# Patient Record
Sex: FEMALE | Race: WHITE | NOT HISPANIC OR LATINO | Employment: FULL TIME | URBAN - METROPOLITAN AREA
[De-identification: names, ages, dates, MRNs, and addresses within clinical notes are randomized per-mention and may not be internally consistent; named-entity substitution may affect disease eponyms.]

---

## 2021-02-03 LAB
EXTERNAL CHLAMYDIA SCREEN: NORMAL
EXTERNAL GONORRHEA SCREEN: NORMAL

## 2021-02-12 LAB
EXTERNAL ABO GROUPING: NORMAL
EXTERNAL ANTIBODY SCREEN: NORMAL
EXTERNAL HEMATOCRIT: 39.9 %
EXTERNAL HEMOGLOBIN: 13.5 G/DL
EXTERNAL HEPATITIS B SURFACE ANTIGEN: NORMAL
EXTERNAL PLATELET COUNT: 305 K/ΜL
EXTERNAL RH FACTOR: POSITIVE
EXTERNAL RUBELLA IGG QUANTITATION: NORMAL
EXTERNAL SYPHILIS RPR SCREEN: NORMAL

## 2021-03-25 ENCOUNTER — TRANSCRIBE ORDERS (OUTPATIENT)
Dept: PERINATAL CARE | Facility: CLINIC | Age: 41
End: 2021-03-25

## 2021-03-25 DIAGNOSIS — O09.899 SUPERVISION OF OTHER HIGH RISK PREGNANCIES, UNSPECIFIED TRIMESTER: Primary | ICD-10-CM

## 2021-03-26 ENCOUNTER — TELEPHONE (OUTPATIENT)
Dept: PERINATAL CARE | Facility: CLINIC | Age: 41
End: 2021-03-26

## 2021-03-26 NOTE — TELEPHONE ENCOUNTER
Called patient to confirm Maternal Fetal Medicine Virtual appointment scheduled for 3/29/21  2:30  ALEXIS CORDOVA  Confirmed she received e-mail and has successfully downloaded the [address]  Explained procedure for virtual visit and requested she log into appointment approximately 10 minutes prior to scheduled start time  Explained the [facility] [address]  will join her at the scheduled appointment time  Patient instructed to call [facility] office @ #[phone] for technical support issues using [employer] TEAMS      LEFT VOICEMAIL FOR PT WITH INFORMATION ABOVE

## 2021-03-29 ENCOUNTER — TELEMEDICINE (OUTPATIENT)
Dept: PERINATAL CARE | Facility: CLINIC | Age: 41
End: 2021-03-29

## 2021-03-29 DIAGNOSIS — O09.529 ANTEPARTUM MULTIGRAVIDA OF ADVANCED MATERNAL AGE: Primary | ICD-10-CM

## 2021-03-29 DIAGNOSIS — Z31.5 ENCOUNTER FOR PROCREATIVE GENETIC COUNSELING: ICD-10-CM

## 2021-03-29 DIAGNOSIS — O26.21 RECURRENT PREGNANCY LOSS IN PREGNANT PATIENT IN FIRST TRIMESTER, ANTEPARTUM: ICD-10-CM

## 2021-03-29 DIAGNOSIS — O09.299 PREVIOUS CHILD WITH TRISOMY, ANTEPARTUM: ICD-10-CM

## 2021-03-29 PROCEDURE — NC001 PR NO CHARGE

## 2021-03-31 NOTE — PROGRESS NOTES
Genetic Counseling   High-Risk Gestation Note    Appointment Date:  3/29/2021  Referred By: Willi Larose MD  YOB: 1980  Partner:  Gage Freeman  Indication for Visit:  advanced maternal age and personal and/or family history of chromosomal abnormality:  previous pregnancy with Trisomy 15  Pregnancy History:   Estimated Date of Delivery: 9/10/21  Estimated Gestational Age: 17w4d    Virtual Regular Visit      Assessment/Plan:    Problem List Items Addressed This Visit     None      Visit Diagnoses     Antepartum multigravida of advanced maternal age    -  Primary    Previous child with Trisomy, antepartum        Recurrent pregnancy loss in pregnant patient in first trimester, antepartum        Encounter for procreative genetic counseling                   Reason for visit is   Chief Complaint   Patient presents with    Virtual Regular Visit        Encounter provider Lary Webster    Provider located at [address] [place] [id number] [phone]      Recent Visits  Date Type Provider Dept   21 Telephone Chadwick Jones, [address]    Showing recent visits within past 7 days and meeting all other requirements     Future Appointments  No visits were found meeting these conditions  Showing future appointments within next 150 days and meeting all other requirements        The patient was identified by name and date of birth  Horace Shaw was informed that this is a telemedicine visit and that the visit is being conducted through [employer] and patient was informed that this is a secure, HIPAA-compliant platform  She agrees to proceed     My office door was closed  No one else was in the room  She acknowledged consent and understanding of privacy and security of the video platform  The patient has agreed to participate and understands they can discontinue the visit at any time      Patient is aware this is a billable service  Cynthia Olivas is a 46 y o  female who presented for genetic counseling to discuss maternal age related risks for chromosome abnormalities and a prior pregnancy with trisomy 13  We reviewed that Trisomy 15 is one of the most common chromosomal abnormalities that results in early pregnancy loss  The recurrence risk for any aneuploidy would not be higher than her age related risk  The risk of Down syndrome at age 46 at delivery is 1/125, and the risk for any chromosomal abnormality at this age is 1/81  The patient had NIPT performed on 21 which was negative or low risk  We reviewed that it is a serum test to identify fragments of fetal DNA in maternal blood  We reviewed the benefits and limitations of cell free fetal DNA screening in detecting Down syndrome, Trisomy 13, Trisomy 25 and sex chromosome aneuploidies  We also discussed that cell free fetal DNA screening does not detect additional chromosomal abnormalities such as Trisomy 15  As cell free fetal DNA screening does not detect open neural tube defects, MSAFP screening is available at 15-20 weeks gestation  The risks, benefits, and limitations of amniocentesis were discussed with the patient  Amniocentesis is performed under direct real time ultrasound visualization to avoid both the fetus and the placenta  Once amniotic fluid is withdrawn, laboratory analysis is performed and amniotic fluid alpha-fetoprotein, as well as chromosome and/or microarray analysis is undertaken  The risk of genetic amniocentesis includes, but is not limited to less than 1 in 300 pregnancy loss rate or  delivery rate if 23 weeks or greater, infection, bleeding, rupture of membranes, failure of cells to grow, karyotype error, laboratory error, etc   Occasionally a repeat amniocentesis is necessary due to cell culture failure    Chromosome/microarray analysis from amniocentesis is 99 9% accurate and alpha-fetoprotein analysis can detect approximately 95% of open neural tube defects  The patient had a first trimester/nuchal translucency ultrasound on 2/25/21 at 12w0d gestation which was within normal limits  We reviewed that level II anatomy ultrasound is typically performed at approximately 20 weeks gestation  Level II ultrasound evaluation is between 60-80% accurate in detecting major physical birth defects and variations in fetal development that may be associated with chromosome abnormalities  Level II ultrasound evaluation is not able to detect all birth defects or health problems  After discussing the additional prenatal testing and screening options this patient declined amniocentesis secondary to procedural related complications  She elected to pursue Level II anatomy ultrasound at the appropriate time  Histories for the patient and her partner's family were taken during our session  Seema reports a significant maternal family history of  melanoma and other cancers, including her mother who had both melanoma and ovarian cancer  We reviewed the availability of cancer genetic counseling for her mother or herself should she be interested in learning of potential risk for a genetic predisposition for cancer  Further review of family history for the patient and her partner was noncontributory  The family history was not significant for other genetic diseases or disorders, intellectual disability, birth defects, fetal loss, or consanguinity  Patient reports being of Moh[place]ish descent and that her  is of Mohawk/English descent  She denies either of them having known Ashkenazi Catholic ancestry  The benefits and limitations of Cystic fibrosis (CF), Spinal muscular atrophy (SMA), hemoglobinopathy, Fragile X, and expanded carrier screening was discussed   The patient stated she may have had cystic fibrosis and spinal muscular atrophy carrier screening in a previous pregnancy, 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, however records were not available to review  She declined expanded carrier screening and was informed that is is available to her at any time should she change her mind  Lastly, we discussed the fact that everyone in the general population regardless of age, family history, or medical background has approximately a 3-5% risk of having a child with some type of congenital anomaly, genetic disease or intellectual disability  Currently there are no tests available to rule out all birth defects or health problems  Jalen Bee was provided with our contact information  I encouraged her to call with any questions or concerns  Plan/Tests Ordered:  1) Patient declined amniocentesis and expanded carrier screening  2) Level II anatomy ultrasound scheduled for 4/28/21  3) Follow up genetic counseling as clinically indicated  HPI     Past Medical History:   Diagnosis Date    Hypothyroid        History reviewed  No pertinent surgical history  No current outpatient medications on file  No current facility-administered medications for this visit  Not on File    Review of Systems    Video Exam    There were no vitals filed for this visit  Physical Exam     I spent 40 minutes directly with the patient during this visit      [address] acknowledges that she has consented to an online visit or consultation  She understands that the online visit is based solely on information provided by her, and that, in the absence of a face-to-face physical evaluation by the physician, the diagnosis she receives is both limited and provisional in terms of accuracy and completeness  This is not intended to replace a full medical face-to-face evaluation by the physician  Marla Wolfe understands and accepts these terms

## 2021-04-22 RX ORDER — SWAB
1 SWAB, NON-MEDICATED MISCELLANEOUS DAILY
COMMUNITY

## 2021-04-22 RX ORDER — LEVOTHYROXINE SODIUM 0.07 MG/1
25 TABLET ORAL
COMMUNITY
End: 2021-08-16

## 2021-04-22 RX ORDER — MULTIVIT-MIN/IRON/FOLIC ACID/K 18-600-40
2000 CAPSULE ORAL DAILY
COMMUNITY

## 2021-04-23 ENCOUNTER — ROUTINE PRENATAL (OUTPATIENT)
Dept: OBGYN CLINIC | Facility: CLINIC | Age: 41
End: 2021-04-23

## 2021-04-23 VITALS
HEIGHT: 64 IN | BODY MASS INDEX: 33.29 KG/M2 | WEIGHT: 195 LBS | SYSTOLIC BLOOD PRESSURE: 114 MMHG | DIASTOLIC BLOOD PRESSURE: 64 MMHG

## 2021-04-23 DIAGNOSIS — Z3A.20 20 WEEKS GESTATION OF PREGNANCY: Primary | ICD-10-CM

## 2021-04-23 DIAGNOSIS — E03.9 HYPOTHYROIDISM AFFECTING PREGNANCY IN SECOND TRIMESTER: ICD-10-CM

## 2021-04-23 DIAGNOSIS — O09.522 ADVANCED MATERNAL AGE IN MULTIGRAVIDA, SECOND TRIMESTER: ICD-10-CM

## 2021-04-23 DIAGNOSIS — O34.211 MATERNAL CARE DUE TO LOW TRANSVERSE UTERINE SCAR FROM PREVIOUS CESAREAN DELIVERY: ICD-10-CM

## 2021-04-23 DIAGNOSIS — O99.282 HYPOTHYROIDISM AFFECTING PREGNANCY IN SECOND TRIMESTER: ICD-10-CM

## 2021-04-23 LAB
SL AMB  POCT GLUCOSE, UA: NORMAL
SL AMB POCT URINE PROTEIN: NORMAL

## 2021-04-23 PROCEDURE — PNV: Performed by: OBSTETRICS & GYNECOLOGY

## 2021-04-23 NOTE — PROGRESS NOTES
Routine Prenatal Visit  [address], [address], [facility], [address] [facility]    Assessment/Plan:  Chris Louise is a 46y o  year old [id number] at 20w0d who presents for routine prenatal visit  1  20 weeks gestation of pregnancy  -     POCT urine dip    2  Maternal care due to low transverse uterine scar from previous  delivery    3  Hypothyroidism affecting pregnancy in second trimester    4  Advanced maternal age in multigravida, second trimester          Subjective:     CC: Prenatal care    Ender Ames is a 46 y o  [id number] female who presents for routine prenatal care at 24w0d  Pregnancy ROS: no  leakage of fluid, pelvic pain, or vaginal bleeding   + fetal movement  The following portions of the patient's history were reviewed and updated as appropriate: allergies, current medications, past family history, past medical history, obstetric history, gynecologic history, past social history, past surgical history and problem list       Objective:  /64   Ht 5' 4" (1 626 m)   Wt 88 5 kg (195 lb)   LMP 2020   BMI 33 47 kg/m²   Pregravid Weight/BMI: Pregravid weight not on file (BMI Could not be calculated)  Current Weight: 88 5 kg (195 lb)   Total Weight Gain: Not found  Pre- Vitals      Most Recent Value   Prenatal Assessment   Fetal Heart Rate  146-152   Fundal Height (cm)  20 cm   Movement  Present   Prenatal Vitals   Blood Pressure  114/64   Weight - Scale  88 5 kg (195 lb)   Urine Albumin/Glucose   Dilation/Effacement/Station   Vaginal Drainage   Edema   LLE Edema  None   RLE Edema  None   Facial Edema  None           General: Well appearing, no distress  Respiratory: Unlabored breathing  Cardiovascular: Regular rate  Abdomen: Soft, gravid, nontender  Fundal Height: Appropriate for gestational age  Extremities: Warm and well perfused  Non tender  + FM ,  BABy ASA daily   Anatomy scan  , labs reviewed

## 2021-04-28 ENCOUNTER — ROUTINE PRENATAL (OUTPATIENT)
Dept: PERINATAL CARE | Facility: CLINIC | Age: 41
End: 2021-04-28
Payer: COMMERCIAL

## 2021-04-28 VITALS
HEIGHT: 64 IN | DIASTOLIC BLOOD PRESSURE: 80 MMHG | HEART RATE: 103 BPM | BODY MASS INDEX: 33.09 KG/M2 | SYSTOLIC BLOOD PRESSURE: 137 MMHG | WEIGHT: 193.8 LBS

## 2021-04-28 DIAGNOSIS — O09.522 ELDERLY MULTIGRAVIDA, SECOND TRIMESTER: Primary | ICD-10-CM

## 2021-04-28 DIAGNOSIS — O09.899 SUPERVISION OF OTHER HIGH RISK PREGNANCIES, UNSPECIFIED TRIMESTER: ICD-10-CM

## 2021-04-28 DIAGNOSIS — O99.282 HYPOTHYROIDISM DURING PREGNANCY IN SECOND TRIMESTER: ICD-10-CM

## 2021-04-28 DIAGNOSIS — Z36.86 ENCOUNTER FOR ANTENATAL SCREENING FOR CERVICAL LENGTH: ICD-10-CM

## 2021-04-28 DIAGNOSIS — Z3A.20 20 WEEKS GESTATION OF PREGNANCY: ICD-10-CM

## 2021-04-28 DIAGNOSIS — E03.9 HYPOTHYROIDISM DURING PREGNANCY IN SECOND TRIMESTER: ICD-10-CM

## 2021-04-28 DIAGNOSIS — O99.212 MATERNAL OBESITY, ANTEPARTUM, SECOND TRIMESTER: ICD-10-CM

## 2021-04-28 PROCEDURE — 76811 OB US DETAILED SNGL FETUS: CPT | Performed by: OBSTETRICS & GYNECOLOGY

## 2021-04-28 PROCEDURE — 76817 TRANSVAGINAL US OBSTETRIC: CPT | Performed by: OBSTETRICS & GYNECOLOGY

## 2021-04-28 PROCEDURE — 99242 OFF/OP CONSLTJ NEW/EST SF 20: CPT | Performed by: OBSTETRICS & GYNECOLOGY

## 2021-04-28 RX ORDER — ASPIRIN 81 MG/1
81 TABLET ORAL DAILY
Status: ON HOLD | COMMUNITY
End: 2021-09-04

## 2021-04-28 NOTE — LETTER
April 30, 2021     Rosie Avila MD  [facility]  [address] [facility] [address]  [facility]    Patient: Marla Wolfe   YOB: 1980   Date of Visit: 4/28/2021       Dear Dr Elsa Gonzalez: Thank you for referring Marla Wolfe to me for evaluation  Below are my notes for this consultation  If you have questions, please do not hesitate to call me  I look forward to following your patient along with you  Sincerely,        Nusrat Whitfield MD        CC: No Recipients  Nusrat Whitfield MD  4/28/2021  7:52 AM  Sign when Signing Visit   Please refer to the [facility] ultrasound report in Ob Procedures for additional information regarding today's visit

## 2021-04-28 NOTE — PROGRESS NOTES
Ultrasound Probe Disinfection    A transvaginal ultrasound was performed  Prior to use, disinfection was performed with High Level Disinfection Process (Trophon)  Probe serial number G2: [id number] was used        Raciel Giles  04/28/21  1:02 PM

## 2021-04-28 NOTE — PROGRESS NOTES
Please refer to the [facility] ultrasound report in Ob Procedures for additional information regarding today's visit

## 2021-05-27 ENCOUNTER — ROUTINE PRENATAL (OUTPATIENT)
Dept: OBGYN CLINIC | Facility: CLINIC | Age: 41
End: 2021-05-27

## 2021-05-27 DIAGNOSIS — O99.282 HYPOTHYROIDISM AFFECTING PREGNANCY IN SECOND TRIMESTER: ICD-10-CM

## 2021-05-27 DIAGNOSIS — E03.9 HYPOTHYROIDISM AFFECTING PREGNANCY IN SECOND TRIMESTER: ICD-10-CM

## 2021-05-27 DIAGNOSIS — O22.00 VARICOSE VEINS DURING PREGNANCY, ANTEPARTUM: ICD-10-CM

## 2021-05-27 DIAGNOSIS — Z3A.24 24 WEEKS GESTATION OF PREGNANCY: ICD-10-CM

## 2021-05-27 DIAGNOSIS — O34.211 MATERNAL CARE DUE TO LOW TRANSVERSE UTERINE SCAR FROM PREVIOUS CESAREAN DELIVERY: ICD-10-CM

## 2021-05-27 DIAGNOSIS — Z36.89 ENCOUNTER FOR OTHER SPECIFIED ANTENATAL SCREENING: ICD-10-CM

## 2021-05-27 DIAGNOSIS — O09.522 ADVANCED MATERNAL AGE IN MULTIGRAVIDA, SECOND TRIMESTER: Primary | ICD-10-CM

## 2021-05-27 LAB
SL AMB  POCT GLUCOSE, UA: NEGATIVE
SL AMB POCT URINE PROTEIN: NORMAL

## 2021-05-27 PROCEDURE — PNV: Performed by: OBSTETRICS & GYNECOLOGY

## 2021-05-27 NOTE — PROGRESS NOTES
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  Places   Support hose only   Right lower leg on while at home  Instructed to place Before getting out of bed in the morning ad wear  All day   Thigh high is the moste ffective    No  Redness   Minimal warmth   No pain  28 week lab slip given  Subjective:     CC: Prenatal care    Cassie Lancaster is a 46 y o  [id number] female who presents for routine prenatal care at 24w6d  Pregnancy ROS: no  leakage of fluid, pelvic pain, or vaginal bleeding   + fetal movement  The following portions of the patient's history were reviewed and updated as appropriate: allergies, current medications, past family history, past medical history, obstetric history, gynecologic history, past social history, past surgical history and problem list       Objective:  LMP 2020   Pregravid Weight/BMI: 84 4 kg (186 lb) (BMI 31 91)  Current Weight:     Total Weight Gain: 3 538 kg (7 lb 12 8 oz)        General: Well appearing, no distress  Respiratory: Unlabored breathing  Cardiovascular: Regular rate  Abdomen: Soft, gravid, nontender  Fundal Height: Appropriate for gestational age  Extremities: Warm and well perfused  Non tender

## 2021-05-27 NOTE — PATIENT INSTRUCTIONS
NUTRITION IN PREGNANCY  Good Nutrition is a VERY important part of having a healthy pregnancy and healthy baby  You should follow a healthy diet which include the following: * Vegetables (which are dark green and leafy): at least 2 servings each day   * Protein (meat, eggs, beans, nuts, peanut butter): 3-4 servings each day   * Breads/whole grains (bread, pasta, rice, tortillas, potatoes): 3 servings each day   * Dairy (milk, yogurt, cheese): 3-4 servings each day   * Water: 6-8 glasses per day   * Calories: approximately 2000 to 2200 calories per day     WEIGHT GAIN   Recommended weight gain for you during your pregnancy is based on your body mass index (BMI) at the time that you became pregnant  Pre-pregnant BMI Recommended weight gain   Underweight (BMI less than 18 5) 28 to 40 pounds   Normal weight (BMI 18 5-24 9) 25 to 35 pounds   Overweight (BMI 25-29 9) 15 to 25 pounds   Obese (BMI 30 or greater) 11 to 20 pounds     FOOD SAFETY   It is VERY important to eat only safely-prepared foods during pregnancy as you and your baby have a higher risk than usual for being affected by foodborne illnesses    Follow these steps to ensure that you and your baby are safe from foodborne illnesses while you are pregnant:   · wash hands thoroughly with warm water and soap before and after handling any foods   · wash cutting boards, dishes, utensils, and countertops with hot water and soap before and after preparing any foods   · rinse raw fruits and vegetables thoroughly under running water before eating   · keep raw meat and seafood separate from other foods and use different cutting boards/utensils to handle raw meat than for other foods   · put cooked food on a freshly clean plate   · cook all of your foods thoroughly   · discard foods that have been left out for more than 2 hours   · refrigerate or freeze any foods than can spoil     There are three particular foodborne risks that you should be aware of and avoid as they can cause serious harm to your unborn child  * Listeria (a harmful bacteria)   · dont eat hot dogs or deli meats (unless theyre reheated until steaming hot)   · dont eat soft cheeses (such as Feta, Brie, Camembert) unless they are specifically labeled as being made with pasteurized milk   · dont drink raw (unpasteurized) milk   · dont eat refrigerated pates or meat spreads   · dont eat refrigerated smoked seafood unless its in a cooked dish like a casserole     * Mercury (a metal which is found in certain fish in high levels)   · dont eat shark, tilefish, asael mackerel, or swordfish   · dont eat more than 12 ounces per week of shrimp, salmon, pollock, or catfish   · when eating tuna fish, you can have up to 6 ounces per week of canned albacore tuna OR up to 12 ounces of canned light tuna     * Toxoplasma (a harmful parasite)   · cook meat thoroughly before eating   · wear gloves when gardening or handling sand from a childs sandbox   · if you ha tve a cat, have someone else change the litter box while you are pregnant  · if you HAVE to clean it yourself, be sure to wash your hands thoroughly afterwards with warm water and soap     · dont get a NEW cat while you are pregnant

## 2021-06-17 ENCOUNTER — ROUTINE PRENATAL (OUTPATIENT)
Dept: OBGYN CLINIC | Facility: CLINIC | Age: 41
End: 2021-06-17

## 2021-06-17 VITALS — SYSTOLIC BLOOD PRESSURE: 110 MMHG | DIASTOLIC BLOOD PRESSURE: 62 MMHG | WEIGHT: 199 LBS | BODY MASS INDEX: 34.16 KG/M2

## 2021-06-17 DIAGNOSIS — Z3A.27 27 WEEKS GESTATION OF PREGNANCY: ICD-10-CM

## 2021-06-17 DIAGNOSIS — O34.211 MATERNAL CARE DUE TO LOW TRANSVERSE UTERINE SCAR FROM PREVIOUS CESAREAN DELIVERY: Primary | ICD-10-CM

## 2021-06-17 PROBLEM — O09.522 AMA (ADVANCED MATERNAL AGE) MULTIGRAVIDA 35+, SECOND TRIMESTER: Status: ACTIVE | Noted: 2021-06-17

## 2021-06-17 LAB
EXTERNAL HEMOGLOBIN: 9.9 G/DL
EXTERNAL PLATELET COUNT: 263 K/ΜL
EXTERNAL SYPHILIS RPR SCREEN: NORMAL
SL AMB  POCT GLUCOSE, UA: NEGATIVE
SL AMB POCT URINE PROTEIN: NEGATIVE

## 2021-06-17 PROCEDURE — PNV: Performed by: OBSTETRICS & GYNECOLOGY

## 2021-06-17 NOTE — PROGRESS NOTES
Routine Prenatal Visit  [address]   [address] [facility], [place] [address]    Assessment/Plan:  Gema Mccoy is a 46y o  year old [id number] at 27w6d who presents for routine prenatal visit  1  Maternal care due to low transverse uterine scar from previous  delivery    2  27 weeks gestation of pregnancy  -     POCT urine dip          Subjective:     CC: Prenatal care    Soy Guardado is a 46 y o  [id number] female who presents for routine prenatal care at 27w6d  Pregnancy ROS: no leakage of fluid, pelvic pain, or vaginal bleeding  normal fetal movement  The following portions of the patient's history were reviewed and updated as appropriate: allergies, current medications, past family history, past medical history, obstetric history, gynecologic history, past social history, past surgical history and problem list       Objective:  /62   Wt 90 3 kg (199 lb)   LMP 2020   BMI 34 16 kg/m²   Pregravid Weight/BMI: 84 4 kg (186 lb) (BMI 30 95)  Current Weight: 90 3 kg (199 lb)   Total Weight Gain: 5 897 kg (13 lb)   Pre- Vitals      Most Recent Value   Prenatal Assessment   Movement  Present   Prenatal Vitals   Blood Pressure  110/62   Weight - Scale  90 3 kg (199 lb)   Urine Albumin/Glucose   Dilation/Effacement/Station   Vaginal Drainage   Edema           General: Well appearing, no distress  Respiratory: Unlabored breathing  Cardiovascular: Regular rate  Abdomen: Soft, gravid, nontender  Fundal Height: Appropriate for gestational age  Extremities: Warm and well perfused  Non tender

## 2021-06-18 PROBLEM — O99.013 ANEMIA AFFECTING PREGNANCY IN THIRD TRIMESTER: Status: ACTIVE | Noted: 2021-06-18

## 2021-06-18 LAB
ERYTHROCYTE [DISTWIDTH] IN BLOOD BY AUTOMATED COUNT: 12.6 % (ref 11.7–15.4)
GLUCOSE 1H P 50 G GLC PO SERPL-MCNC: 109 MG/DL (ref 65–139)
HCT VFR BLD AUTO: 30 % (ref 34–46.6)
HGB BLD-MCNC: 9.9 G/DL (ref 11.1–15.9)
MCH RBC QN AUTO: 28.6 PG (ref 26.6–33)
MCHC RBC AUTO-ENTMCNC: 33 G/DL (ref 31.5–35.7)
MCV RBC AUTO: 87 FL (ref 79–97)
PLATELET # BLD AUTO: 263 X10E3/UL (ref 150–450)
RBC # BLD AUTO: 3.46 X10E6/UL (ref 3.77–5.28)
RPR SER QL: NON REACTIVE
WBC # BLD AUTO: 9.3 X10E3/UL (ref 3.4–10.8)

## 2021-06-23 ENCOUNTER — ULTRASOUND (OUTPATIENT)
Dept: PERINATAL CARE | Facility: CLINIC | Age: 41
End: 2021-06-23
Payer: COMMERCIAL

## 2021-06-23 VITALS
HEART RATE: 97 BPM | BODY MASS INDEX: 33.94 KG/M2 | HEIGHT: 64 IN | SYSTOLIC BLOOD PRESSURE: 106 MMHG | DIASTOLIC BLOOD PRESSURE: 72 MMHG | WEIGHT: 198.8 LBS

## 2021-06-23 DIAGNOSIS — O09.523 AMA (ADVANCED MATERNAL AGE) MULTIGRAVIDA 35+, THIRD TRIMESTER: Primary | ICD-10-CM

## 2021-06-23 DIAGNOSIS — Z3A.28 28 WEEKS GESTATION OF PREGNANCY: ICD-10-CM

## 2021-06-23 PROCEDURE — 99213 OFFICE O/P EST LOW 20 MIN: CPT | Performed by: OBSTETRICS & GYNECOLOGY

## 2021-06-23 PROCEDURE — 76816 OB US FOLLOW-UP PER FETUS: CPT | Performed by: OBSTETRICS & GYNECOLOGY

## 2021-06-23 RX ORDER — FERROUS SULFATE 325(65) MG
325 TABLET ORAL 2 TIMES DAILY WITH MEALS
COMMUNITY

## 2021-06-23 NOTE — PROGRESS NOTES
The patient was seen today for an ultrasound  Please see ultrasound report (located under Ob Procedures) for additional details  Thank you very much for allowing us to participate in the care of this very nice patient  Should you have any questions, please do not hesitate to contact me  Graham Horton MD [address] [facility]  Attending Physician, [place]

## 2021-07-01 NOTE — PATIENT INSTRUCTIONS
- Continue prenatal vitamins and all prescribed medications  - Try to drink 64 oz of water or other non-caffeinated fluids daily    - Fetal kick counts (to be done daily or if note a decrease in fetal movement):  in a quiet place, after a meal or drinking something sweet, lie on your side and count movements  Should get 10 movements in 2 hours  Call office if less than this  - Call office if leaking of fluid, bleeding, contractions >5-6/hour which don't decrease with rest, oral hydration, or emptying bladder, or decreased fetal movement

## 2021-07-02 ENCOUNTER — ROUTINE PRENATAL (OUTPATIENT)
Dept: OBGYN CLINIC | Facility: CLINIC | Age: 41
End: 2021-07-02
Payer: COMMERCIAL

## 2021-07-02 VITALS — DIASTOLIC BLOOD PRESSURE: 70 MMHG | SYSTOLIC BLOOD PRESSURE: 128 MMHG | BODY MASS INDEX: 34.06 KG/M2 | WEIGHT: 198.4 LBS

## 2021-07-02 DIAGNOSIS — O34.211 MATERNAL CARE DUE TO LOW TRANSVERSE UTERINE SCAR FROM PREVIOUS CESAREAN DELIVERY: ICD-10-CM

## 2021-07-02 DIAGNOSIS — O09.523 AMA (ADVANCED MATERNAL AGE) MULTIGRAVIDA 35+, THIRD TRIMESTER: Primary | ICD-10-CM

## 2021-07-02 DIAGNOSIS — Z3A.30 30 WEEKS GESTATION OF PREGNANCY: ICD-10-CM

## 2021-07-02 DIAGNOSIS — Z23 ENCOUNTER FOR IMMUNIZATION: ICD-10-CM

## 2021-07-02 DIAGNOSIS — O99.013 ANEMIA AFFECTING PREGNANCY IN THIRD TRIMESTER: ICD-10-CM

## 2021-07-02 PROBLEM — O99.213 OBESITY AFFECTING PREGNANCY IN THIRD TRIMESTER: Status: ACTIVE | Noted: 2021-07-02

## 2021-07-02 LAB
SL AMB  POCT GLUCOSE, UA: NEGATIVE
SL AMB POCT URINE PROTEIN: NEGATIVE

## 2021-07-02 PROCEDURE — 90715 TDAP VACCINE 7 YRS/> IM: CPT | Performed by: OBSTETRICS & GYNECOLOGY

## 2021-07-02 PROCEDURE — PNV: Performed by: OBSTETRICS & GYNECOLOGY

## 2021-07-02 PROCEDURE — 90471 IMMUNIZATION ADMIN: CPT | Performed by: OBSTETRICS & GYNECOLOGY

## 2021-07-02 NOTE — PROGRESS NOTES
Routine Prenatal Visit  [address]   [address] [facility], [facility], [place] [address]    Assessment/Plan:  Jose J Hernandez is a 46y o  year old [id number] at 30w0d who presents for routine prenatal visit  1  30 weeks gestation of pregnancy  -     POCT urine dip    2  AMA (advanced maternal age) multigravida 33+, third trimester    3  Maternal care due to low transverse uterine scar from previous  delivery  Assessment & Plan:  Discussed scheduling repeat LTCS and BS at 39 weeks - will discuss w/ surgical scheduler, likely schedule at [facility]  Discussed w/ pt today, she is aware  4  Anemia affecting pregnancy in third trimester  Assessment & Plan:  Taking po iron daily, recom increase to bid if tolerating  5  Encounter for immunization  Comments: Tdap today  Orders:  -     tetanus-diphtheria-acellular pertussis (ADACEL) IM injection 0 5 mL      Next OB Visit 2 weeks  Subjective:     CC: Prenatal care    Magno Gerardo is a 46 y o  [id number] female who presents for routine prenatal care at 30w0d  Pregnancy ROS: no leakage of fluid, pelvic pain, or vaginal bleeding  normal fetal movement      The following portions of the patient's history were reviewed and updated as appropriate: allergies, current medications, past family history, past medical history, obstetric history, gynecologic history, past social history, past surgical history and problem list       Objective:  /70   Wt 90 kg (198 lb 6 4 oz)   LMP 2020   BMI 34 06 kg/m²   Pregravid Weight/BMI: 84 4 kg (186 lb) (BMI 31 91)  Current Weight: 90 kg (198 lb 6 4 oz)   Total Weight Gain: 5 625 kg (12 lb 6 4 oz)   Pre-Mata Vitals      Most Recent Value   Prenatal Assessment   Fetal Heart Rate  130   Fundal Height (cm)  30 cm   Movement  Present   Presentation  Vertex   Prenatal Vitals   Blood Pressure  128/70   Weight - Scale  90 kg (198 lb 6 4 oz)   Urine Albumin/Glucose   Dilation/Effacement/Station   Vaginal Drainage Edema   LLE Edema  Trace   RLE Edema  Trace   Facial Edema  None           General: Well appearing, no distress  Abdomen: Soft, gravid, nontender  Fundal Height: Appropriate for gestational age  Extremities: Warm and well perfused  Non tender

## 2021-07-02 NOTE — ASSESSMENT & PLAN NOTE
Discussed scheduling repeat LTCS and BS at 39 weeks - will discuss w/ surgical scheduler, likely schedule at [facility]  Discussed w/ pt today, she is aware

## 2021-07-14 ENCOUNTER — ROUTINE PRENATAL (OUTPATIENT)
Dept: OBGYN CLINIC | Facility: CLINIC | Age: 41
End: 2021-07-14

## 2021-07-14 VITALS
BODY MASS INDEX: 33.72 KG/M2 | DIASTOLIC BLOOD PRESSURE: 70 MMHG | SYSTOLIC BLOOD PRESSURE: 128 MMHG | HEIGHT: 65 IN | WEIGHT: 202.4 LBS

## 2021-07-14 DIAGNOSIS — O09.523 AMA (ADVANCED MATERNAL AGE) MULTIGRAVIDA 35+, THIRD TRIMESTER: ICD-10-CM

## 2021-07-14 DIAGNOSIS — O99.013 ANEMIA AFFECTING PREGNANCY IN THIRD TRIMESTER: ICD-10-CM

## 2021-07-14 DIAGNOSIS — Z30.2 REQUEST FOR STERILIZATION: ICD-10-CM

## 2021-07-14 DIAGNOSIS — O99.213 OBESITY AFFECTING PREGNANCY IN THIRD TRIMESTER: ICD-10-CM

## 2021-07-14 DIAGNOSIS — Z3A.31 31 WEEKS GESTATION OF PREGNANCY: Primary | ICD-10-CM

## 2021-07-14 DIAGNOSIS — O34.211 MATERNAL CARE DUE TO LOW TRANSVERSE UTERINE SCAR FROM PREVIOUS CESAREAN DELIVERY: ICD-10-CM

## 2021-07-14 LAB
SL AMB  POCT GLUCOSE, UA: NEGATIVE
SL AMB POCT URINE PROTEIN: NEGATIVE

## 2021-07-14 PROCEDURE — PNV: Performed by: OBSTETRICS & GYNECOLOGY

## 2021-07-14 NOTE — PROGRESS NOTES
Routine Prenatal Visit  [address]   [address] [facility], [place] [address]    Assessment/Plan:  Jraod Stevens is a 46y o  year old [id number] at 31w5d who presents for routine prenatal visit  1  31 weeks gestation of pregnancy  -     POCT urine dip    2  Obesity affecting pregnancy in third trimester    3  AMA (advanced maternal age) multigravida 33+, third trimester    4  Maternal care due to low transverse uterine scar from previous  delivery    5  Anemia affecting pregnancy in third trimester    6  Request for sterilization        Next OB Visit 2 weeks  Subjective:     CC: Prenatal care    Divya Tomas is a 46 y o  [id number] female who presents for routine prenatal care at 31w5d  Pregnancy ROS: no leakage of fluid, pelvic pain, or vaginal bleeding  nml fetal movement  The following portions of the patient's history were reviewed and updated as appropriate: allergies, current medications, past family history, past medical history, obstetric history, gynecologic history, past social history, past surgical history and problem list       Objective:  /70   Ht 5' 5" (1 651 m)   Wt 91 8 kg (202 lb 6 4 oz)   LMP 2020   BMI 33 68 kg/m²   Pregravid Weight/BMI: 84 4 kg (186 lb) (BMI 30 95)  Current Weight: 91 8 kg (202 lb 6 4 oz)   Total Weight Gain: 7 439 kg (16 lb 6 4 oz)   Pre- Vitals      Most Recent Value   Prenatal Assessment   Fetal Heart Rate  140   Fundal Height (cm)  32 cm   Movement  Present   Presentation  Vertex   Prenatal Vitals   Blood Pressure  128/70   Weight - Scale  91 8 kg (202 lb 6 4 oz)   Urine Albumin/Glucose   Dilation/Effacement/Station   Vaginal Drainage   Draining Fluid  No   Edema   LLE Edema  None   RLE Edema  Trace   Facial Edema  None           General: Well appearing, no distress  Abdomen: Soft, gravid, nontender  Fundal Height: Appropriate for gestational age  Extremities: Warm and well perfused  Non tender

## 2021-07-21 ENCOUNTER — TELEPHONE (OUTPATIENT)
Dept: OBGYN CLINIC | Facility: CLINIC | Age: 41
End: 2021-07-21

## 2021-07-21 NOTE — TELEPHONE ENCOUNTER
[facility]  Form completed, returned fax to [phone]   Document sent to [facility]

## 2021-07-28 ENCOUNTER — ROUTINE PRENATAL (OUTPATIENT)
Dept: OBGYN CLINIC | Facility: CLINIC | Age: 41
End: 2021-07-28

## 2021-07-28 VITALS — WEIGHT: 201.6 LBS | BODY MASS INDEX: 33.55 KG/M2 | DIASTOLIC BLOOD PRESSURE: 68 MMHG | SYSTOLIC BLOOD PRESSURE: 120 MMHG

## 2021-07-28 DIAGNOSIS — O09.523 AMA (ADVANCED MATERNAL AGE) MULTIGRAVIDA 35+, THIRD TRIMESTER: ICD-10-CM

## 2021-07-28 DIAGNOSIS — O99.213 OBESITY AFFECTING PREGNANCY IN THIRD TRIMESTER: ICD-10-CM

## 2021-07-28 DIAGNOSIS — O34.211 MATERNAL CARE DUE TO LOW TRANSVERSE UTERINE SCAR FROM PREVIOUS CESAREAN DELIVERY: ICD-10-CM

## 2021-07-28 DIAGNOSIS — Z3A.33 33 WEEKS GESTATION OF PREGNANCY: Primary | ICD-10-CM

## 2021-07-28 DIAGNOSIS — O99.013 ANEMIA AFFECTING PREGNANCY IN THIRD TRIMESTER: ICD-10-CM

## 2021-07-28 LAB
SL AMB  POCT GLUCOSE, UA: NEGATIVE
SL AMB POCT URINE PROTEIN: NEGATIVE

## 2021-07-28 PROCEDURE — PNV: Performed by: OBSTETRICS & GYNECOLOGY

## 2021-07-28 NOTE — PROGRESS NOTES
Routine Prenatal Visit  [address]   [address] [facility], [facility], [place] [address]    Assessment/Plan:  Isha Gaitan is a 46y o  year old [id number] at 33w5d who presents for routine prenatal visit  1  33 weeks gestation of pregnancy  -     POCT urine dip    2  Obesity affecting pregnancy in third trimester    3  AMA (advanced maternal age) multigravida 33+, third trimester    4  Maternal care due to low transverse uterine scar from previous  delivery     -   Pt scheduled for repeat LTCS and B/L salpingectomy for 9/3/2021    5  Anemia affecting pregnancy in third trimester        Next OB Visit 2 weeks  Subjective:     CC: Prenatal care    Sai Greenberg is a 46 y o  [id number] female who presents for routine prenatal care at 33w5d  Pregnancy ROS: No leakage of fluid, pelvic pain, or vaginal bleeding  Nml fetal movement  The following portions of the patient's history were reviewed and updated as appropriate: allergies, current medications, past family history, past medical history, obstetric history, gynecologic history, past social history, past surgical history and problem list       Objective:  /68   Wt 91 4 kg (201 lb 9 6 oz)   LMP 2020   BMI 33 55 kg/m²   Pregravid Weight/BMI: 84 4 kg (186 lb) (BMI 30 95)  Current Weight: 91 4 kg (201 lb 9 6 oz)   Total Weight Gain: 7 076 kg (15 lb 9 6 oz)   Pre-Mata Vitals      Most Recent Value   Prenatal Assessment   Fetal Heart Rate  140   Fundal Height (cm)  34 cm   Movement  Present   Presentation  Vertex   Prenatal Vitals   Blood Pressure  120/68   Weight - Scale  91 4 kg (201 lb 9 6 oz)   Urine Albumin/Glucose   Dilation/Effacement/Station   Vaginal Drainage   Draining Fluid  No   Edema           General: Well appearing, no distress  Abdomen: Soft, gravid, nontender  Fundal Height: Appropriate for gestational age  Extremities: Warm and well perfused  Non tender

## 2021-07-30 ENCOUNTER — TELEPHONE (OUTPATIENT)
Dept: OBGYN CLINIC | Facility: CLINIC | Age: 41
End: 2021-07-30

## 2021-07-30 NOTE — TELEPHONE ENCOUNTER
----- Message from Lui Barrientos MD sent at 2021 12:32 PM EDT -----  Regarding: Deatra Patient, BS at [address] [place] [address],  I talked to Tennille Villalta and told her we would likely do her  at [facility] and she is happy with that  Are we able to schedule that for her for 9/3/2021? Please let me know    Nicholas Guzman

## 2021-08-06 ENCOUNTER — ULTRASOUND (OUTPATIENT)
Dept: PERINATAL CARE | Facility: CLINIC | Age: 41
End: 2021-08-06
Payer: COMMERCIAL

## 2021-08-06 VITALS
DIASTOLIC BLOOD PRESSURE: 83 MMHG | HEART RATE: 78 BPM | WEIGHT: 204.4 LBS | HEIGHT: 65 IN | BODY MASS INDEX: 34.05 KG/M2 | SYSTOLIC BLOOD PRESSURE: 123 MMHG

## 2021-08-06 DIAGNOSIS — Z36.89 ENCOUNTER FOR ULTRASOUND TO CHECK FETAL GROWTH: ICD-10-CM

## 2021-08-06 DIAGNOSIS — O09.523 AMA (ADVANCED MATERNAL AGE) MULTIGRAVIDA 35+, THIRD TRIMESTER: Primary | ICD-10-CM

## 2021-08-06 PROCEDURE — 76816 OB US FOLLOW-UP PER FETUS: CPT | Performed by: OBSTETRICS & GYNECOLOGY

## 2021-08-06 NOTE — PROGRESS NOTES
Andrea Shah: Ms Divya Laughlin was seen today for fetal growth assessment ultrasound  See ultrasound report under "OB Procedures" tab  Please don't hesitate to contact our office with any concerns or questions    Albina Cleaning MD

## 2021-08-06 NOTE — LETTER
August 6, 2021     King Watson MD  [facility] [address]  [address] [facility] [address] 4  [facility]    Patient: Sonal Ponce   YOB: 1980   Date of Visit: 8/6/2021       Dear Dr Hayden Sweet: Thank you for referring Chyna Almaguer to me for evaluation  Below are my notes for this consultation  If you have questions, please do not hesitate to call me  I look forward to following your patient along with you           Sincerely,        Ruby Monroe MD        CC: No Recipients

## 2021-08-06 NOTE — PATIENT INSTRUCTIONS
Thank you for choosing us for your  care today  If you have any questions about your ultrasound or care, please do not hesitate to contact us or your primary obstetrician  Some general instructions for your pregnancy are:     Protect against coronavirus: get vaccinated and mask up  Pregnant women are increased risk of severe COVID  Notify your primary care doctor if you have any symptoms including cough, shortness of breath or difficulty breathing, fever, chills, muscle pain, sore throat, or loss of taste or smell   Exercise: Aim for 22 minutes per day (150 minutes per week) of regular exercise  Walking is great!  Nutrition: aim for calcium-rich and iron-rich foods as well as healthy sources of protein   Learn about Preeclampsia: preeclampsia is a common, serious high blood pressure complication in pregnancy  A blood pressure of [id number] (systolic or top number) or [id number] (diastolic or bottom number) is not normal and needs evaluation by your doctor  Aspirin is sometimes prescribed in early pregnancy to prevent preeclampsia in women with risk factors - ask your obstetrician if you should be on this medication   If you smoke, try to reduce how many cigarettes you smoke or try to quit completely  Do not vape   Other warning signs to watch out for in pregnancy or postpartum: chest pain, obstructed breathing or shortness of breath, seizures, thoughts of hurting yourself or your baby, bleeding, a painful or swollen leg, fever, or headache (see [employer] POST-BIRTH Warning Signs campaign)  If these happen call 911  Itching is also not normal in pregnancy and if you experience this, especially over your hands and feet, potentially worse at night, notify your doctors

## 2021-08-16 ENCOUNTER — ROUTINE PRENATAL (OUTPATIENT)
Dept: OBGYN CLINIC | Facility: CLINIC | Age: 41
End: 2021-08-16

## 2021-08-16 VITALS
WEIGHT: 210.2 LBS | SYSTOLIC BLOOD PRESSURE: 128 MMHG | BODY MASS INDEX: 35.02 KG/M2 | DIASTOLIC BLOOD PRESSURE: 70 MMHG | HEIGHT: 65 IN

## 2021-08-16 DIAGNOSIS — O09.523 AMA (ADVANCED MATERNAL AGE) MULTIGRAVIDA 35+, THIRD TRIMESTER: ICD-10-CM

## 2021-08-16 DIAGNOSIS — O34.211 MATERNAL CARE DUE TO LOW TRANSVERSE UTERINE SCAR FROM PREVIOUS CESAREAN DELIVERY: Primary | ICD-10-CM

## 2021-08-16 DIAGNOSIS — O99.213 OBESITY AFFECTING PREGNANCY IN THIRD TRIMESTER: ICD-10-CM

## 2021-08-16 DIAGNOSIS — Z36.85 ANTENATAL SCREENING FOR STREPTOCOCCUS B: ICD-10-CM

## 2021-08-16 DIAGNOSIS — O99.013 ANEMIA AFFECTING PREGNANCY IN THIRD TRIMESTER: ICD-10-CM

## 2021-08-16 PROCEDURE — PNV: Performed by: STUDENT IN AN ORGANIZED HEALTH CARE EDUCATION/TRAINING PROGRAM

## 2021-08-16 RX ORDER — LEVOTHYROXINE SODIUM 0.03 MG/1
TABLET ORAL
COMMUNITY
Start: 2021-08-06

## 2021-08-16 NOTE — ASSESSMENT & PLAN NOTE
- Labor/Bleeding/ROM precautions given  Kick counts reviewed  - GBS swab collected today  Surgical consent for repeat  section with bilateral salpingectomy reviewed and signed  Risks of surgery reviewed, including bleeding, infection, damage to surrounding organs/structures (specifically bowel, bladder, vessels, nerves), scar tissue formation, hernia, regret regarding permanent sterilization  All questions answered  Patient agrees to proceed with surgery     - Problem list updated  - PMH, PSH, medications reviewed and updated as needed  - Return to office in 1 wk(s) for routine prenatal care

## 2021-08-16 NOTE — PROGRESS NOTES
Routine Prenatal Visit  [address]   [address] [facility], [facility], [place] [address]    Assessment/Plan:  Jarod Stevens is a 46y o  year old [id number] at 36w3d who presents for routine prenatal visit  1  Maternal care due to low transverse uterine scar from previous  delivery  Assessment & Plan:  - Labor/Bleeding/ROM precautions given  Kick counts reviewed  - GBS swab collected today  Surgical consent for repeat  section with bilateral salpingectomy reviewed and signed  Risks of surgery reviewed, including bleeding, infection, damage to surrounding organs/structures (specifically bowel, bladder, vessels, nerves), scar tissue formation, hernia, regret regarding permanent sterilization  All questions answered  Patient agrees to proceed with surgery  - Problem list updated  - PMH, PSH, medications reviewed and updated as needed  - Return to office in 1 wk(s) for routine prenatal care        2  Obesity affecting pregnancy in third trimester    3  AMA (advanced maternal age) multigravida 33+, third trimester    4  Anemia affecting pregnancy in third trimester    5   screening for streptococcus B  -     Strep B DNA probe, amplification      Subjective:   Divya Tomas is a 46 y o  [id number] who presents for routine prenatal care at 36w3d  Complaints today: No  LOF: No; VB: No; Contractions: Irregular; FM: Present and normal    Objective:  /70   Ht 5' 5" (1 651 m)   Wt 95 3 kg (210 lb 3 2 oz)   LMP 2020   BMI 34 98 kg/m²     General: Well appearing, no distress  Respiratory: Unlabored breathing  Cardiovascular: Regular rate  Abdomen: Soft, gravid, nontender  Extremities: Warm and well perfused  Non tender      Pregravid Weight/BMI: 84 4 kg (186 lb) (BMI 30 95)  Current Weight: 95 3 kg (210 lb 3 2 oz)   Total Weight Gain: 11 kg (24 lb 3 2 oz)     Pre- Vitals      Most Recent Value   Prenatal Assessment   Fetal Heart Rate  150   Fundal Height (cm)  38 cm   Movement Present   Presentation  Vertex   Prenatal Vitals   Blood Pressure  128/70   Weight - Scale  95 3 kg (210 lb 3 2 oz)   Urine Albumin/Glucose   Dilation/Effacement/Station   Vaginal Drainage   Draining Fluid  No   Edema   LLE Edema  Trace   RLE Edema  Trace   Facial Edema  None           Rosa Dosdon MD  8/16/2021 10:31 AM

## 2021-08-18 LAB — GP B STREP DNA SPEC QL NAA+PROBE: NEGATIVE

## 2021-08-24 ENCOUNTER — ROUTINE PRENATAL (OUTPATIENT)
Dept: OBGYN CLINIC | Facility: CLINIC | Age: 41
End: 2021-08-24

## 2021-08-24 VITALS — BODY MASS INDEX: 34.78 KG/M2 | SYSTOLIC BLOOD PRESSURE: 130 MMHG | WEIGHT: 209 LBS | DIASTOLIC BLOOD PRESSURE: 68 MMHG

## 2021-08-24 DIAGNOSIS — O99.213 OBESITY AFFECTING PREGNANCY IN THIRD TRIMESTER: ICD-10-CM

## 2021-08-24 DIAGNOSIS — O99.013 ANEMIA AFFECTING PREGNANCY IN THIRD TRIMESTER: ICD-10-CM

## 2021-08-24 DIAGNOSIS — Z30.2 REQUEST FOR STERILIZATION: ICD-10-CM

## 2021-08-24 DIAGNOSIS — O34.211 MATERNAL CARE DUE TO LOW TRANSVERSE UTERINE SCAR FROM PREVIOUS CESAREAN DELIVERY: ICD-10-CM

## 2021-08-24 DIAGNOSIS — O09.523 AMA (ADVANCED MATERNAL AGE) MULTIGRAVIDA 35+, THIRD TRIMESTER: Primary | ICD-10-CM

## 2021-08-24 PROCEDURE — PNV: Performed by: OBSTETRICS & GYNECOLOGY

## 2021-08-24 NOTE — PROGRESS NOTES
Routine Prenatal Visit  [address], [facility], [address] [facility]    Assessment/Plan:  Marybeth Benson is a 46y o  year old [id number] at 37w4d who presents for routine prenatal visit  1  AMA (advanced maternal age) multigravida 33+, third trimester    2  Maternal care due to low transverse uterine scar from previous  delivery    3  Anemia affecting pregnancy in third trimester    4  Obesity affecting pregnancy in third trimester    5  Request for sterilization        Next OB Visit 1 weeks  Subjective:     CC: Prenatal care    Jeri Posey is a 46 y o  [id number] female who presents for routine prenatal care at 37w4d  Pregnancy ROS: No leakage of fluid, pelvic pain, or vaginal bleeding  Nml fetal movement  The following portions of the patient's history were reviewed and updated as appropriate: allergies, current medications, past family history, past medical history, obstetric history, gynecologic history, past social history, past surgical history and problem list       Objective:  /68   Wt 94 8 kg (209 lb)   LMP 2020   BMI 34 78 kg/m²   Pregravid Weight/BMI: 84 4 kg (186 lb) (BMI 30 95)  Current Weight: 94 8 kg (209 lb)   Total Weight Gain: 10 4 kg (23 lb)   Pre-Mata Vitals      Most Recent Value   Prenatal Assessment   Fetal Heart Rate  150   Fundal Height (cm)  39 cm   Movement  Present   Presentation  Vertex   Prenatal Vitals   Blood Pressure  130/68   Weight - Scale  94 8 kg (209 lb)   Urine Albumin/Glucose   Dilation/Effacement/Station   Vaginal Drainage   Draining Fluid  No   Edema           General: Well appearing, no distress  Abdomen: Soft, gravid, nontender  Fundal Height: Appropriate for gestational age  Extremities: Warm and well perfused  Non tender

## 2021-08-30 ENCOUNTER — DOCUMENTATION (OUTPATIENT)
Dept: OBGYN CLINIC | Facility: CLINIC | Age: 41
End: 2021-08-30

## 2021-08-30 DIAGNOSIS — O99.013 ANEMIA AFFECTING PREGNANCY IN THIRD TRIMESTER: ICD-10-CM

## 2021-08-30 DIAGNOSIS — O34.211 MATERNAL CARE DUE TO LOW TRANSVERSE UTERINE SCAR FROM PREVIOUS CESAREAN DELIVERY: ICD-10-CM

## 2021-08-30 DIAGNOSIS — O99.213 OBESITY AFFECTING PREGNANCY IN THIRD TRIMESTER: Primary | ICD-10-CM

## 2021-08-30 DIAGNOSIS — O09.523 AMA (ADVANCED MATERNAL AGE) MULTIGRAVIDA 35+, THIRD TRIMESTER: ICD-10-CM

## 2021-08-31 NOTE — PROGRESS NOTES
Patient presented to [facility] with ROM and early labor at 38 3wks, h/o  and planned repeat  with bilateral salpingectomy at the end of the week  She underwent LTCS and bilateral salpingectomy, delivery of male infant  She did have a late post partum hemorrhage following procedure  EBL 1900cc for surgery and hemorrhage after  POD#1 hgb 8 2g/dl (from 11 2g/dl on admission)

## 2021-09-04 ENCOUNTER — HOSPITAL ENCOUNTER (INPATIENT)
Facility: HOSPITAL | Age: 41
LOS: 3 days | Discharge: HOME | DRG: 776 | End: 2021-09-07
Attending: OBSTETRICS & GYNECOLOGY | Admitting: OBSTETRICS & GYNECOLOGY
Payer: COMMERCIAL

## 2021-09-04 DIAGNOSIS — O34.211 MATERNAL CARE DUE TO LOW TRANSVERSE UTERINE SCAR FROM PREVIOUS CESAREAN DELIVERY: ICD-10-CM

## 2021-09-04 DIAGNOSIS — E87.6 HYPOKALEMIA: ICD-10-CM

## 2021-09-04 DIAGNOSIS — O99.213 OBESITY AFFECTING PREGNANCY IN THIRD TRIMESTER: ICD-10-CM

## 2021-09-04 PROBLEM — E03.9 ACQUIRED HYPOTHYROIDISM: Status: ACTIVE | Noted: 2021-09-04

## 2021-09-04 LAB
ALBUMIN SERPL BCP-MCNC: 2.6 G/DL (ref 3.5–5)
ALP SERPL-CCNC: 87 U/L (ref 46–116)
ALT SERPL W P-5'-P-CCNC: 46 U/L (ref 12–78)
ANION GAP SERPL CALCULATED.3IONS-SCNC: 12 MMOL/L (ref 4–13)
AST SERPL W P-5'-P-CCNC: 42 U/L (ref 5–45)
BILIRUB SERPL-MCNC: 0.4 MG/DL (ref 0.2–1)
BUN SERPL-MCNC: 9 MG/DL (ref 5–25)
CALCIUM ALBUM COR SERPL-MCNC: 9.4 MG/DL (ref 8.3–10.1)
CALCIUM SERPL-MCNC: 8.3 MG/DL (ref 8.3–10.1)
CHLORIDE SERPL-SCNC: 107 MMOL/L (ref 100–108)
CO2 SERPL-SCNC: 26 MMOL/L (ref 21–32)
CREAT SERPL-MCNC: 0.65 MG/DL (ref 0.6–1.3)
CREAT UR-MCNC: 14.3 MG/DL
ERYTHROCYTE [DISTWIDTH] IN BLOOD BY AUTOMATED COUNT: 14.3 % (ref 11.6–15.1)
GFR SERPL CREATININE-BSD FRML MDRD: 112 ML/MIN/1.73SQ M
GLUCOSE SERPL-MCNC: 77 MG/DL (ref 65–140)
HCT VFR BLD AUTO: 28.3 % (ref 34.8–46.1)
HGB BLD-MCNC: 8.9 G/DL (ref 11.5–15.4)
MCH RBC QN AUTO: 29.7 PG (ref 26.8–34.3)
MCHC RBC AUTO-ENTMCNC: 31.4 G/DL (ref 31.4–37.4)
MCV RBC AUTO: 94 FL (ref 82–98)
PLATELET # BLD AUTO: 300 THOUSANDS/UL (ref 149–390)
PMV BLD AUTO: 9.6 FL (ref 8.9–12.7)
POTASSIUM SERPL-SCNC: 3.4 MMOL/L (ref 3.5–5.3)
PROT SERPL-MCNC: 6.3 G/DL (ref 6.4–8.2)
PROT UR-MCNC: 58 MG/DL
PROT/CREAT UR: 4.06 MG/G{CREAT} (ref 0–0.1)
RBC # BLD AUTO: 3 MILLION/UL (ref 3.81–5.12)
SODIUM SERPL-SCNC: 145 MMOL/L (ref 136–145)
WBC # BLD AUTO: 9.13 THOUSAND/UL (ref 4.31–10.16)

## 2021-09-04 PROCEDURE — 85027 COMPLETE CBC AUTOMATED: CPT | Performed by: OBSTETRICS & GYNECOLOGY

## 2021-09-04 PROCEDURE — 80053 COMPREHEN METABOLIC PANEL: CPT | Performed by: OBSTETRICS & GYNECOLOGY

## 2021-09-04 PROCEDURE — 99202 OFFICE O/P NEW SF 15 MIN: CPT

## 2021-09-04 PROCEDURE — NC001 PR NO CHARGE: Performed by: OBSTETRICS & GYNECOLOGY

## 2021-09-04 PROCEDURE — 84156 ASSAY OF PROTEIN URINE: CPT | Performed by: OBSTETRICS & GYNECOLOGY

## 2021-09-04 PROCEDURE — 99214 OFFICE O/P EST MOD 30 MIN: CPT | Performed by: OBSTETRICS & GYNECOLOGY

## 2021-09-04 PROCEDURE — G0379 DIRECT REFER HOSPITAL OBSERV: HCPCS

## 2021-09-04 PROCEDURE — 82570 ASSAY OF URINE CREATININE: CPT | Performed by: OBSTETRICS & GYNECOLOGY

## 2021-09-04 RX ORDER — LEVOTHYROXINE SODIUM 0.03 MG/1
25 TABLET ORAL
Status: DISCONTINUED | OUTPATIENT
Start: 2021-09-05 | End: 2021-09-07 | Stop reason: HOSPADM

## 2021-09-04 RX ORDER — SODIUM CHLORIDE, SODIUM LACTATE, POTASSIUM CHLORIDE, CALCIUM CHLORIDE 600; 310; 30; 20 MG/100ML; MG/100ML; MG/100ML; MG/100ML
75 INJECTION, SOLUTION INTRAVENOUS CONTINUOUS
Status: DISCONTINUED | OUTPATIENT
Start: 2021-09-04 | End: 2021-09-05

## 2021-09-04 RX ORDER — BUTALBITAL, ACETAMINOPHEN AND CAFFEINE 50; 325; 40 MG/1; MG/1; MG/1
1 TABLET ORAL ONCE
Status: COMPLETED | OUTPATIENT
Start: 2021-09-04 | End: 2021-09-04

## 2021-09-04 RX ORDER — LABETALOL 20 MG/4 ML (5 MG/ML) INTRAVENOUS SYRINGE
20 ONCE
Status: COMPLETED | OUTPATIENT
Start: 2021-09-04 | End: 2021-09-04

## 2021-09-04 RX ORDER — NIFEDIPINE 30 MG/1
30 TABLET, EXTENDED RELEASE ORAL DAILY
Status: DISCONTINUED | OUTPATIENT
Start: 2021-09-04 | End: 2021-09-05

## 2021-09-04 RX ORDER — ONDANSETRON 2 MG/ML
4 INJECTION INTRAMUSCULAR; INTRAVENOUS EVERY 8 HOURS PRN
Status: DISCONTINUED | OUTPATIENT
Start: 2021-09-04 | End: 2021-09-07 | Stop reason: HOSPADM

## 2021-09-04 RX ORDER — SIMETHICONE 80 MG
80 TABLET,CHEWABLE ORAL 4 TIMES DAILY PRN
Status: DISCONTINUED | OUTPATIENT
Start: 2021-09-04 | End: 2021-09-07 | Stop reason: HOSPADM

## 2021-09-04 RX ORDER — FERROUS SULFATE 325(65) MG
325 TABLET ORAL 2 TIMES DAILY WITH MEALS
Status: DISCONTINUED | OUTPATIENT
Start: 2021-09-04 | End: 2021-09-07 | Stop reason: HOSPADM

## 2021-09-04 RX ORDER — MAGNESIUM SULFATE HEPTAHYDRATE 40 MG/ML
2 INJECTION, SOLUTION INTRAVENOUS CONTINUOUS
Status: DISCONTINUED | OUTPATIENT
Start: 2021-09-04 | End: 2021-09-05

## 2021-09-04 RX ORDER — NIFEDIPINE 10 MG/1
10 CAPSULE ORAL ONCE
Status: COMPLETED | OUTPATIENT
Start: 2021-09-04 | End: 2021-09-04

## 2021-09-04 RX ORDER — IBUPROFEN 600 MG/1
600 TABLET ORAL EVERY 6 HOURS PRN
Status: DISCONTINUED | OUTPATIENT
Start: 2021-09-04 | End: 2021-09-07 | Stop reason: HOSPADM

## 2021-09-04 RX ORDER — TRISODIUM CITRATE DIHYDRATE AND CITRIC ACID MONOHYDRATE 500; 334 MG/5ML; MG/5ML
30 SOLUTION ORAL 4 TIMES DAILY PRN
Status: DISCONTINUED | OUTPATIENT
Start: 2021-09-04 | End: 2021-09-07 | Stop reason: HOSPADM

## 2021-09-04 RX ORDER — DOCUSATE SODIUM 100 MG/1
100 CAPSULE, LIQUID FILLED ORAL 2 TIMES DAILY
Status: DISCONTINUED | OUTPATIENT
Start: 2021-09-04 | End: 2021-09-07 | Stop reason: HOSPADM

## 2021-09-04 RX ORDER — NIFEDIPINE 10 MG/1
10 CAPSULE ORAL EVERY 8 HOURS SCHEDULED
Status: DISCONTINUED | OUTPATIENT
Start: 2021-09-04 | End: 2021-09-04

## 2021-09-04 RX ORDER — ACETAMINOPHEN 325 MG/1
650 TABLET ORAL EVERY 6 HOURS PRN
Status: DISCONTINUED | OUTPATIENT
Start: 2021-09-04 | End: 2021-09-07 | Stop reason: HOSPADM

## 2021-09-04 RX ORDER — MAGNESIUM SULFATE HEPTAHYDRATE 40 MG/ML
4 INJECTION, SOLUTION INTRAVENOUS ONCE
Status: COMPLETED | OUTPATIENT
Start: 2021-09-04 | End: 2021-09-04

## 2021-09-04 RX ORDER — CALCIUM GLUCONATE 94 MG/ML
1 INJECTION, SOLUTION INTRAVENOUS ONCE AS NEEDED
Status: DISCONTINUED | OUTPATIENT
Start: 2021-09-04 | End: 2021-09-07 | Stop reason: HOSPADM

## 2021-09-04 RX ORDER — MAGNESIUM SULFATE HEPTAHYDRATE 40 MG/ML
INJECTION, SOLUTION INTRAVENOUS
Status: COMPLETED
Start: 2021-09-04 | End: 2021-09-04

## 2021-09-04 RX ADMIN — MAGNESIUM SULFATE HEPTAHYDRATE 4 G: 40 INJECTION, SOLUTION INTRAVENOUS at 15:39

## 2021-09-04 RX ADMIN — SODIUM CHLORIDE, SODIUM LACTATE, POTASSIUM CHLORIDE, AND CALCIUM CHLORIDE 75 ML/HR: .6; .31; .03; .02 INJECTION, SOLUTION INTRAVENOUS at 15:39

## 2021-09-04 RX ADMIN — DOCUSATE SODIUM 100 MG: 100 CAPSULE, LIQUID FILLED ORAL at 17:56

## 2021-09-04 RX ADMIN — FERROUS SULFATE TAB 325 MG (65 MG ELEMENTAL FE) 325 MG: 325 (65 FE) TAB at 16:03

## 2021-09-04 RX ADMIN — NIFEDIPINE 10 MG: 10 CAPSULE ORAL at 14:40

## 2021-09-04 RX ADMIN — NIFEDIPINE 30 MG: 30 TABLET, FILM COATED, EXTENDED RELEASE ORAL at 18:32

## 2021-09-04 RX ADMIN — LABETALOL 20 MG/4 ML (5 MG/ML) INTRAVENOUS SYRINGE 20 MG: at 22:22

## 2021-09-04 RX ADMIN — ACETAMINOPHEN 650 MG: 325 TABLET, FILM COATED ORAL at 16:03

## 2021-09-04 RX ADMIN — IBUPROFEN 600 MG: 600 TABLET ORAL at 16:03

## 2021-09-04 RX ADMIN — BUTALBITAL, ACETAMINOPHEN AND CAFFEINE 1 TABLET: 50; 325; 40 TABLET ORAL at 23:47

## 2021-09-04 RX ADMIN — MAGNESIUM SULFATE IN WATER 2 G/HR: 40 INJECTION, SOLUTION INTRAVENOUS at 16:10

## 2021-09-04 NOTE — H&P
History & Physical - OB/GYN   Romy Castrejon 46 y o  female MRN: [id number]  Unit/Bed#: -01 Encounter: [id number]    46 y o  yo [id number] who is 5 days post partum and post op from repeat LTCS and bilateral salpingectomy on 8/30/2021 at [facility]   Her admission delivery was complicated by mild preeclampsia diagnosed at delivery and postpartum hemorrhage with anemia  She did well postop and her blood pressures where only elevated in the mild range 130-140s/70-90s upon discharge  Her postop hgb was 8 2g/dl and she was asymptomatic  She is a patient of [address]  Chief complaint:  Elevated BP at home    HPI:  Sloane Martin was discharged home on PPD/POD#3 without symptoms of preeclampsia  She was told to monitor BP at home and report if >160/110, or develops symptoms of severe preeclampsia  She called the on call doctor today reporting her BP at home were systolic [phone]  She though maybe the blood pressure cuff she had was inaccurate, so she went to a local pharmacy and they were high there as well  She denies HA currently - although states she did have a mild HA upon waking that resolved with Tylenol, she feels it was due to lack of sleep  She denies vision changes, N/V, epigastric or RUQ pain  She does report LE edema bilaterally which has worsened since discharge      Pregnancy Complications:  V0,B2,KJP:6/88/6425 Problems (from 03/29/21 to present)     Problem Noted Resolved    Obesity affecting pregnancy in third trimester 7/2/2021 by Charlene Dumont MD No    Overview Signed 7/2/2021  9:45 AM by Charlene Dumont MD     Baseline BMI 31         Anemia affecting pregnancy in third trimester 6/18/2021 by ROSA Alexander No    Overview Signed 7/1/2021  1:54 PM by Charlene Dumont MD     28wks hgb 9 9g/dl         AMA (advanced maternal age) multigravida 33+, third trimester 6/17/2021 by Mark Mitchell MD No    Maternal care due to low transverse uterine scar from previous  delivery 2021 by Mark Mitchell MD No          PMH:  Past Medical History:   Diagnosis Date    Anemia     Gestational hypertension     Hidradenitis     groin area    HPV (human papilloma virus) infection     Hypothyroid     Papanicolaou smear 2018       PSH:  Past Surgical History:   Procedure Laterality Date     SECTION  , ,     COLPOSCOPY      CYST REMOVAL  2014    groin     DILATION AND EVACUATION  2015, 2014    SALPINGECTOMY Bilateral 2021    during        Social Hx:  Social History     Tobacco Use    Smoking status: Never Smoker    Smokeless tobacco: Never Used   Vaping Use    Vaping Use: Never used   Substance Use Topics    Alcohol use: Not Currently     Comment: special occasions    Drug use: Never          OB Hx:  OB History    Para Term  AB Living   7 3 3 0 4 3   SAB TAB Ectopic Multiple Live Births   4 0 0 0 3      # Outcome Date GA Lbr Chaparro/2nd Weight Sex Delivery Anes PTL Lv   7 Term 21 38w3d  4360 g (9 lb 9 8 oz) M CS-LTranv Spinal  NAYE      Birth Comments: ROM prior to delivery, mild preelampsia   6 Term 18 39w0d  4026 g (8 lb 14 oz) F CS-LTranv Spinal  NAYE   5 Term 16 37w5d  3856 g (8 lb 8 oz) F CS-LTranv EPI  NAYE   4 SAB 2015 8w0d          3 SAB 2015 6w0d          2 SAB 2014 6w0d          1 SAB 2014 9w0d              Meds:  No current facility-administered medications on file prior to encounter       Current Outpatient Medications on File Prior to Encounter   Medication Sig Dispense Refill    ferrous sulfate 325 (65 Fe) mg tablet Take 325 mg by mouth 2 (two) times a day with meals       levothyroxine 25 mcg tablet TAKE 1 TABLET BY MOUTH ON AN EMPTY STOMACH IN THE MORNING      Cholecalciferol (Vitamin D) 50 MCG (2000 UT) CAPS Take 2,000 Units by mouth daily      Prenatal Vit-Fe Fumarate-FA (prenatal multivitamin) 28-0 8 MG TABS Take 1 tablet by mouth daily      [DISCONTINUED] aspirin (ECOTRIN LOW STRENGTH) 81 mg EC tablet Take 81 mg by mouth daily         Allergies: Allergies   Allergen Reactions    Clindamycin Rash     Category: Allergy; Annotation - [id number]: hives         Labs:  Lab Results   Component Value Date    WBC 9 13 09/04/2021    HGB 8 9 (L) 09/04/2021    HCT 28 3 (L) 09/04/2021    MCV 94 09/04/2021     09/04/2021     Lab Results   Component Value Date    K 3 4 (L) 09/04/2021     09/04/2021    CO2 26 09/04/2021    BUN 9 09/04/2021    CREATININE 0 65 09/04/2021    CALCIUM 8 3 09/04/2021    CORRECTEDCA 9 4 09/04/2021    AST 42 09/04/2021    ALT 46 09/04/2021    ALKPHOS 87 09/04/2021    EGFR 112 09/04/2021     Physical exam:  Vitals:    09/04/21 1532 09/04/21 1600 09/04/21 1610 09/04/21 1636   BP: 163/92  159/86 152/87   BP Location: Right arm  Right arm    Pulse: 84  83 93   Resp: 18 18 18 18   Temp: 98 6 °F (37 °C) 98 6 °F (37 °C) 98 6 °F (37 °C)    TempSrc: Oral Oral Oral    SpO2:   99%        Gen: NAD  Heart: RRR  Lungs: CTA b/l  Abdomen: soft, NT, ND  Fundus: firm, -2 finger below umbilicus, non-tender  Incision: C/D/I  Extremities: non-tender, +1 edema B/L, reflexes +2 B/L      Assessment:   46 y o  [id number] at POD#5 from LTCS and BS complicated by mild preeclampsia and postpartum anemia - presents with pre-eclampsia with severe features based on persistent BP >160/90s    Plan:     1) Hypertension in pregnancy, preeclampsia, severe, delivered/postpartum  Patient with mild preeclampsia at delivery and d/c home POD#3 with mildly elevated BP  She returned today asx but BP elevated 160s/90s at home  First /96,  Then 185/94  She received Procardia 10mg after 2nd BP, next /78, then 159/84  Magnesium sulfate was started for seizure prophylaxis  Will continue to monitor and start Procardia XL if persist >150/90  Discussed w/ pt and  dx of severe preelcampsia based on blood pressures and risk of progression to eclampsia  Recommend 24 hours of IV magnesium sulfate for seizure prophylaxis  Discussed risks, benefits, alternatives  VS per protocol, strict I/O (bathroom privileges okay as long as not neuro symptoms), clear fluid diet, may progress to house diet if pt is hungry and no neuro symptoms)  Repeat labs in morning  2) Anemia, postpartum  Current hgb 8 9g/dl  Will continue PO iron as long as pt is tolerating      3) Acquired hypothyroidism  Continue 25mcg daily    4) s/p   Motrin and Tylenol prn pain

## 2021-09-04 NOTE — PROGRESS NOTES
Patient arrived via ambulation with   traiged for htn, dr Teresita Reddy aware of initial BP  Blood work sent to lab and patient given oral procardia  Patient denies vision changes, headache, lethargy, but + for b/l LE edema

## 2021-09-04 NOTE — PLAN OF CARE
Problem: SAFETY ADULT  Goal: Patient will remain free of falls  Description: INTERVENTIONS:  - Educate patient/family on patient safety including physical limitations  - Instruct patient to call for assistance with activity   - Consult OT/PT to assist with strengthening/mobility   - Keep Call bell within reach  - Keep bed low and locked with side rails adjusted as appropriate  - Keep care items and personal belongings within reach  - Initiate and maintain comfort rounds  - Make Fall Risk Sign visible to staff  - Offer Toileting every  Hours, in advance of need  - Initiate/Maintain alarm  - Obtain necessary fall risk management equipment:   - Apply yellow socks and bracelet for high fall risk patients  - Consider moving patient to room near nurses station  Outcome: Progressing  Goal: Maintain or return to baseline ADL function  Description: INTERVENTIONS:  -  Assess patient's ability to carry out ADLs; assess patient's baseline for ADL function and identify physical deficits which impact ability to perform ADLs (bathing, care of mouth/teeth, toileting, grooming, dressing, etc )  - Assess/evaluate cause of self-care deficits   - Assess range of motion  - Assess patient's mobility; develop plan if impaired  - Assess patient's need for assistive devices and provide as appropriate  - Encourage maximum independence but intervene and supervise when necessary  - Involve family in performance of ADLs  - Assess for home care needs following discharge   - Consider OT consult to assist with ADL evaluation and planning for discharge  - Provide patient education as appropriate  Outcome: Progressing  Goal: Maintains/Returns to pre admission functional level  Description: INTERVENTIONS:  - Perform BMAT or MOVE assessment daily    - Set and communicate daily mobility goal to care team and patient/family/caregiver     - Collaborate with rehabilitation services on mobility goals if consulted  - Out of bed for toileting  - Record patient progress and toleration of activity level   Outcome: Progressing     Problem: Knowledge Deficit  Goal: Patient/family/caregiver demonstrates understanding of disease process, treatment plan, medications, and discharge instructions  Description: Complete learning assessment and assess knowledge base    Interventions:  - Provide teaching at level of understanding  - Provide teaching via preferred learning methods  Outcome: Progressing     Problem: DISCHARGE PLANNING  Goal: Discharge to home or other facility with appropriate resources  Description: INTERVENTIONS:  - Identify barriers to discharge w/patient and caregiver  - Arrange for needed discharge resources and transportation as appropriate  - Identify discharge learning needs (meds, wound care, etc )  - Arrange for interpretive services to assist at discharge as needed  - Refer to Case Management Department for coordinating discharge planning if the patient needs post-hospital services based on physician/advanced practitioner order or complex needs related to functional status, cognitive ability, or social support system  Outcome: Progressing

## 2021-09-04 NOTE — ASSESSMENT & PLAN NOTE
Magnesium Sulfate started yesterday ~17:00 for seizure prophylaxis after pt received Procardia 10mg PO for severe BP  BP improved  Long acting Procardia XL 30mg PO was given at 18:30, due to SBP mostly 150s  At 22:15 pt with severe range BP and received Labetalol 20mg IV with improvement  This morning she received Procardia XL 30mg at 8:00  BP continues to be elevated in mild range, many with   Will continue to watch closely and if BP do not improve to  or below, will discuss w/ MFM and consider increase Procardia to 60mg  Pt diuresing well, swelling improved  Mild HA this morning, improved from overnight when received Fioricet  Pt feeling a little light headed as well  Hgb 10, no evidence blood loss  Likely side effect from magnesium  DTR normal w/o signs of magnesium toxicity  Magnesium to continue  Until 17:00 tonight  Will likely continue to monitor pt overnight for BP surveillance after Magnesium discontinued

## 2021-09-04 NOTE — PLAN OF CARE
Problem: SAFETY ADULT  Goal: Patient will remain free of falls  Description: INTERVENTIONS:  - Educate patient/family on patient safety including physical limitations  - Instruct patient to call for assistance with activity   - Consult OT/PT to assist with strengthening/mobility   - Keep Call bell within reach  - Keep bed low and locked with side rails adjusted as appropriate  - Keep care items and personal belongings within reach  - Initiate and maintain comfort rounds  - Make Fall Risk Sign visible to staff  - Apply yellow socks and bracelet for high fall risk patients  - Consider moving patient to room near nurses station  Outcome: Progressing  Goal: Maintain or return to baseline ADL function  Description: INTERVENTIONS:  -  Assess patient's ability to carry out ADLs; assess patient's baseline for ADL function and identify physical deficits which impact ability to perform ADLs (bathing, care of mouth/teeth, toileting, grooming, dressing, etc )  - Assess/evaluate cause of self-care deficits   - Assess range of motion  - Assess patient's mobility; develop plan if impaired  - Assess patient's need for assistive devices and provide as appropriate  - Encourage maximum independence but intervene and supervise when necessary  - Involve family in performance of ADLs  - Assess for home care needs following discharge   - Consider OT consult to assist with ADL evaluation and planning for discharge  - Provide patient education as appropriate  Outcome: Progressing  Goal: Maintains/Returns to pre admission functional level  Description: INTERVENTIONS:  - Perform BMAT or MOVE assessment daily    - Set and communicate daily mobility goal to care team and patient/family/caregiver     - Collaborate with rehabilitation services on mobility goals if consulted  - Out of bed for toileting  - Record patient progress and toleration of activity level   Outcome: Progressing     Problem: Knowledge Deficit  Goal: Patient/family/caregiver demonstrates understanding of disease process, treatment plan, medications, and discharge instructions  Description: Complete learning assessment and assess knowledge base    Interventions:  - Provide teaching at level of understanding  - Provide teaching via preferred learning methods  Outcome: Progressing     Problem: DISCHARGE PLANNING  Goal: Discharge to home or other facility with appropriate resources  Description: INTERVENTIONS:  - Identify barriers to discharge w/patient and caregiver  - Arrange for needed discharge resources and transportation as appropriate  - Identify discharge learning needs (meds, wound care, etc )  - Arrange for interpretive services to assist at discharge as needed  - Refer to Case Management Department for coordinating discharge planning if the patient needs post-hospital services based on physician/advanced practitioner order or complex needs related to functional status, cognitive ability, or social support system  Outcome: Progressing     Problem: POSTPARTUM  Goal: Experiences normal postpartum course  Description: INTERVENTIONS:  - Monitor maternal vital signs  - Assess uterine involution and lochia  Outcome: Progressing  Goal: Appropriate maternal -  bonding  Description: INTERVENTIONS:  - Identify family support  - Assess for appropriate maternal/infant bonding   -Encourage maternal/infant bonding opportunities  - Referral to  or  as needed  Outcome: Progressing  Goal: Establishment of infant feeding pattern  Description: INTERVENTIONS:  - Assess breast/bottle feeding  - Refer to lactation as needed  Outcome: Progressing  Goal: Incision(s), wounds(s) or drain site(s) healing without S/S of infection  Description: INTERVENTIONS  - Assess and document dressing, incision, wound bed, drain sites and surrounding tissue  - Provide patient and family education    Outcome: Progressing

## 2021-09-05 PROBLEM — E87.6 HYPOKALEMIA: Status: ACTIVE | Noted: 2021-09-05

## 2021-09-05 LAB
ALBUMIN SERPL BCP-MCNC: 3 G/DL (ref 3.5–5)
ALP SERPL-CCNC: 101 U/L (ref 46–116)
ALT SERPL W P-5'-P-CCNC: 47 U/L (ref 12–78)
ANION GAP SERPL CALCULATED.3IONS-SCNC: 10 MMOL/L (ref 4–13)
ANION GAP SERPL CALCULATED.3IONS-SCNC: 9 MMOL/L (ref 4–13)
AST SERPL W P-5'-P-CCNC: 39 U/L (ref 5–45)
BILIRUB SERPL-MCNC: 0.6 MG/DL (ref 0.2–1)
BUN SERPL-MCNC: 5 MG/DL (ref 5–25)
BUN SERPL-MCNC: 8 MG/DL (ref 5–25)
CALCIUM ALBUM COR SERPL-MCNC: 8 MG/DL (ref 8.3–10.1)
CALCIUM SERPL-MCNC: 7.2 MG/DL (ref 8.3–10.1)
CALCIUM SERPL-MCNC: 7.4 MG/DL (ref 8.3–10.1)
CHLORIDE SERPL-SCNC: 101 MMOL/L (ref 100–108)
CHLORIDE SERPL-SCNC: 103 MMOL/L (ref 100–108)
CO2 SERPL-SCNC: 28 MMOL/L (ref 21–32)
CO2 SERPL-SCNC: 31 MMOL/L (ref 21–32)
CREAT SERPL-MCNC: 0.56 MG/DL (ref 0.6–1.3)
CREAT SERPL-MCNC: 0.75 MG/DL (ref 0.6–1.3)
ERYTHROCYTE [DISTWIDTH] IN BLOOD BY AUTOMATED COUNT: 14.2 % (ref 11.6–15.1)
GFR SERPL CREATININE-BSD FRML MDRD: 101 ML/MIN/1.73SQ M
GFR SERPL CREATININE-BSD FRML MDRD: 118 ML/MIN/1.73SQ M
GLUCOSE SERPL-MCNC: 104 MG/DL (ref 65–140)
GLUCOSE SERPL-MCNC: 116 MG/DL (ref 65–140)
GLUCOSE SERPL-MCNC: 92 MG/DL (ref 65–140)
HCT VFR BLD AUTO: 31.3 % (ref 34.8–46.1)
HGB BLD-MCNC: 10.2 G/DL (ref 11.5–15.4)
MCH RBC QN AUTO: 29.1 PG (ref 26.8–34.3)
MCHC RBC AUTO-ENTMCNC: 32.6 G/DL (ref 31.4–37.4)
MCV RBC AUTO: 89 FL (ref 82–98)
PLATELET # BLD AUTO: 358 THOUSANDS/UL (ref 149–390)
PMV BLD AUTO: 8.9 FL (ref 8.9–12.7)
POTASSIUM SERPL-SCNC: 2.5 MMOL/L (ref 3.5–5.3)
POTASSIUM SERPL-SCNC: 3 MMOL/L (ref 3.5–5.3)
POTASSIUM SERPL-SCNC: 3.2 MMOL/L (ref 3.5–5.3)
POTASSIUM SERPL-SCNC: 3.2 MMOL/L (ref 3.5–5.3)
POTASSIUM SERPL-SCNC: 3.3 MMOL/L (ref 3.5–5.3)
PROT SERPL-MCNC: 7.1 G/DL (ref 6.4–8.2)
RBC # BLD AUTO: 3.5 MILLION/UL (ref 3.81–5.12)
SODIUM SERPL-SCNC: 141 MMOL/L (ref 136–145)
SODIUM SERPL-SCNC: 141 MMOL/L (ref 136–145)
WBC # BLD AUTO: 9.31 THOUSAND/UL (ref 4.31–10.16)

## 2021-09-05 PROCEDURE — 85027 COMPLETE CBC AUTOMATED: CPT | Performed by: OBSTETRICS & GYNECOLOGY

## 2021-09-05 PROCEDURE — 99233 SBSQ HOSP IP/OBS HIGH 50: CPT | Performed by: OBSTETRICS & GYNECOLOGY

## 2021-09-05 PROCEDURE — 80053 COMPREHEN METABOLIC PANEL: CPT | Performed by: OBSTETRICS & GYNECOLOGY

## 2021-09-05 PROCEDURE — 80048 BASIC METABOLIC PNL TOTAL CA: CPT | Performed by: OBSTETRICS & GYNECOLOGY

## 2021-09-05 PROCEDURE — 84132 ASSAY OF SERUM POTASSIUM: CPT | Performed by: OBSTETRICS & GYNECOLOGY

## 2021-09-05 PROCEDURE — NC001 PR NO CHARGE: Performed by: OBSTETRICS & GYNECOLOGY

## 2021-09-05 PROCEDURE — 82948 REAGENT STRIP/BLOOD GLUCOSE: CPT

## 2021-09-05 RX ORDER — NIFEDIPINE 30 MG/1
30 TABLET, EXTENDED RELEASE ORAL DAILY
Status: DISCONTINUED | OUTPATIENT
Start: 2021-09-05 | End: 2021-09-05

## 2021-09-05 RX ORDER — POTASSIUM CHLORIDE 20 MEQ/1
40 TABLET, EXTENDED RELEASE ORAL ONCE
Status: COMPLETED | OUTPATIENT
Start: 2021-09-05 | End: 2021-09-05

## 2021-09-05 RX ORDER — POTASSIUM CHLORIDE 20 MEQ/1
20 TABLET, EXTENDED RELEASE ORAL ONCE
Status: COMPLETED | OUTPATIENT
Start: 2021-09-05 | End: 2021-09-05

## 2021-09-05 RX ORDER — NIFEDIPINE 30 MG/1
30 TABLET, EXTENDED RELEASE ORAL ONCE
Status: COMPLETED | OUTPATIENT
Start: 2021-09-05 | End: 2021-09-05

## 2021-09-05 RX ORDER — NIFEDIPINE 30 MG/1
30 TABLET, EXTENDED RELEASE ORAL DAILY
Status: DISCONTINUED | OUTPATIENT
Start: 2021-09-06 | End: 2021-09-05

## 2021-09-05 RX ORDER — NIFEDIPINE 30 MG/1
60 TABLET, EXTENDED RELEASE ORAL DAILY
Status: DISCONTINUED | OUTPATIENT
Start: 2021-09-06 | End: 2021-09-07

## 2021-09-05 RX ADMIN — NIFEDIPINE 30 MG: 30 TABLET, FILM COATED, EXTENDED RELEASE ORAL at 21:15

## 2021-09-05 RX ADMIN — POTASSIUM CHLORIDE 40 MEQ: 1500 TABLET, EXTENDED RELEASE ORAL at 18:12

## 2021-09-05 RX ADMIN — DOCUSATE SODIUM 100 MG: 100 CAPSULE, LIQUID FILLED ORAL at 17:14

## 2021-09-05 RX ADMIN — FERROUS SULFATE TAB 325 MG (65 MG ELEMENTAL FE) 325 MG: 325 (65 FE) TAB at 16:58

## 2021-09-05 RX ADMIN — POTASSIUM CHLORIDE 40 MEQ: 1500 TABLET, EXTENDED RELEASE ORAL at 08:57

## 2021-09-05 RX ADMIN — NIFEDIPINE 30 MG: 30 TABLET, FILM COATED, EXTENDED RELEASE ORAL at 13:16

## 2021-09-05 RX ADMIN — NIFEDIPINE 30 MG: 30 TABLET, FILM COATED, EXTENDED RELEASE ORAL at 08:07

## 2021-09-05 RX ADMIN — IBUPROFEN 600 MG: 600 TABLET ORAL at 08:57

## 2021-09-05 RX ADMIN — SODIUM CHLORIDE, SODIUM LACTATE, POTASSIUM CHLORIDE, AND CALCIUM CHLORIDE 75 ML/HR: .6; .31; .03; .02 INJECTION, SOLUTION INTRAVENOUS at 04:36

## 2021-09-05 RX ADMIN — IBUPROFEN 600 MG: 600 TABLET ORAL at 16:58

## 2021-09-05 RX ADMIN — MAGNESIUM SULFATE IN WATER 2 G/HR: 40 INJECTION, SOLUTION INTRAVENOUS at 12:57

## 2021-09-05 RX ADMIN — DOCUSATE SODIUM 100 MG: 100 CAPSULE, LIQUID FILLED ORAL at 08:07

## 2021-09-05 RX ADMIN — ACETAMINOPHEN 650 MG: 325 TABLET, FILM COATED ORAL at 08:07

## 2021-09-05 RX ADMIN — DOCUSATE SODIUM 100 MG: 100 CAPSULE, LIQUID FILLED ORAL at 16:58

## 2021-09-05 RX ADMIN — LEVOTHYROXINE SODIUM 25 MCG: 25 TABLET ORAL at 06:00

## 2021-09-05 RX ADMIN — POTASSIUM CHLORIDE 40 MEQ: 1500 TABLET, EXTENDED RELEASE ORAL at 13:29

## 2021-09-05 RX ADMIN — FERROUS SULFATE TAB 325 MG (65 MG ELEMENTAL FE) 325 MG: 325 (65 FE) TAB at 08:07

## 2021-09-05 RX ADMIN — IBUPROFEN 600 MG: 600 TABLET ORAL at 03:11

## 2021-09-05 RX ADMIN — POTASSIUM CHLORIDE 20 MEQ: 1500 TABLET, EXTENDED RELEASE ORAL at 22:17

## 2021-09-05 RX ADMIN — MAGNESIUM SULFATE IN WATER 2 G/HR: 40 INJECTION, SOLUTION INTRAVENOUS at 02:07

## 2021-09-05 NOTE — PROGRESS NOTES
Progress Note - OB/GYN  Post-Partum Physician Note   Aliya Graham 46 y o  female MRN: [id number]  Unit/Bed#:  209-01 Encounter: [id number]    Patient is postop and postpartum day 6 from repeat LTCS and bilateral salpingectomy performed 8/30/2021  She was readmitted yesterday with severe pre-eclampsia and is now HD#2      Subjective:   Pain: mild headache, improved with fiorocet overnight  Tolerating Oral Intake:yes  Voiding: yes  Flatus: yes  Chest Pain: no  Shortness of Breath: no  Leg Pain/Discomfort: no, swelling significantly improved  Lochia: minimal  Other: a bit light headed with standing    Breastfeeding: Breastfeeding    Objective:   Vitals:    09/05/21 0800 09/05/21 1001 09/05/21 1100 09/05/21 1158   BP: 152/91 156/92 148/88 155/93   BP Location:       Pulse: 83 91  84   Resp: 18 18  18   Temp: 99 5 °F (37 5 °C)      TempSrc: Temporal      SpO2: 96% 96%  96%   Height:           Intake/Output Summary (Last 24 hours) at 9/5/2021 1206  Last data filed at 9/5/2021 0631  Gross per 24 hour   Intake 3440 42 ml   Output 98806 ml   Net -6809 58 ml         Physical Exam:  General: in no apparent distress  Cardiac: RRR  Pulm: CTA bilaterally  Abdomen: abdomen is soft without significant tenderness, masses, organomegaly or guarding  Fundus: Firm, 3 below the umbilicus  Lower extremeties: trace edema, SCDs in place, non-tender; reflex +2    Labs/Tests:     Recent Results (from the past 24 hour(s))   CBC and Platelet    Collection Time: 09/04/21  2:32 PM   Result Value Ref Range    WBC 9 13 4 31 - 10 16 Thousand/uL    RBC 3 00 (L) 3 81 - 5 12 Million/uL    Hemoglobin 8 9 (L) 11 5 - 15 4 g/dL    Hematocrit 28 3 (L) 34 8 - 46 1 %    MCV 94 82 - 98 fL    MCH 29 7 26 8 - 34 3 pg    MCHC 31 4 31 4 - 37 4 g/dL    RDW 14 3 11 6 - 15 1 %    Platelets 116 292 - 915 Thousands/uL    MPV 9 6 8 9 - 12 7 fL   Comprehensive metabolic panel    Collection Time: 09/04/21  2:32 PM   Result Value Ref Range    Sodium 145 136 - 145 mmol/L    Potassium 3 4 (L) 3 5 - 5 3 mmol/L    Chloride 107 100 - 108 mmol/L    CO2 26 21 - 32 mmol/L    ANION GAP 12 4 - 13 mmol/L    BUN 9 5 - 25 mg/dL    Creatinine 0 65 0 60 - 1 30 mg/dL    Glucose 77 65 - 140 mg/dL    Calcium 8 3 8 3 - 10 1 mg/dL    Corrected Calcium 9 4 8 3 - 10 1 mg/dL    AST 42 5 - 45 U/L    ALT 46 12 - 78 U/L    Alkaline Phosphatase 87 46 - 116 U/L    Total Protein 6 3 (L) 6 4 - 8 2 g/dL    Albumin 2 6 (L) 3 5 - 5 0 g/dL    Total Bilirubin 0 40 0 20 - 1 00 mg/dL    eGFR 112 ml/min/1 73sq m   Protein / creatinine ratio, urine    Collection Time: 09/04/21  2:32 PM   Result Value Ref Range    Creatinine, Ur 14 3 mg/dL    Protein Urine Random 58 mg/dL    Prot/Creat Ratio, Ur 4 06 (H) 0 00 - 0 10   Fingerstick Glucose (POCT)    Collection Time: 09/05/21 12:18 AM   Result Value Ref Range    POC Glucose 116 65 - 140 mg/dl   CBC and Platelet    Collection Time: 09/05/21  6:03 AM   Result Value Ref Range    WBC 9 31 4 31 - 10 16 Thousand/uL    RBC 3 50 (L) 3 81 - 5 12 Million/uL    Hemoglobin 10 2 (L) 11 5 - 15 4 g/dL    Hematocrit 31 3 (L) 34 8 - 46 1 %    MCV 89 82 - 98 fL    MCH 29 1 26 8 - 34 3 pg    MCHC 32 6 31 4 - 37 4 g/dL    RDW 14 2 11 6 - 15 1 %    Platelets 523 865 - 095 Thousands/uL    MPV 8 9 8 9 - 12 7 fL   Comprehensive metabolic panel    Collection Time: 09/05/21  6:03 AM   Result Value Ref Range    Sodium 141 136 - 145 mmol/L    Potassium 2 5 (LL) 3 5 - 5 3 mmol/L    Chloride 101 100 - 108 mmol/L    CO2 31 21 - 32 mmol/L    ANION GAP 9 4 - 13 mmol/L    BUN 5 5 - 25 mg/dL    Creatinine 0 56 (L) 0 60 - 1 30 mg/dL    Glucose 92 65 - 140 mg/dL    Calcium 7 2 (L) 8 3 - 10 1 mg/dL    Corrected Calcium 8 0 (L) 8 3 - 10 1 mg/dL    AST 39 5 - 45 U/L    ALT 47 12 - 78 U/L    Alkaline Phosphatase 101 46 - 116 U/L    Total Protein 7 1 6 4 - 8 2 g/dL    Albumin 3 0 (L) 3 5 - 5 0 g/dL    Total Bilirubin 0 60 0 20 - 1 00 mg/dL    eGFR 118 ml/min/1 73sq m       MEDS:   Current Facility-Administered Medications   Medication Dose Route Frequency    acetaminophen (TYLENOL) tablet 650 mg  650 mg Oral Q6H PRN    calcium gluconate 10 % injection 1 g  1 g Intravenous Once PRN    docusate sodium (COLACE) capsule 100 mg  100 mg Oral BID    ferrous sulfate tablet 325 mg  325 mg Oral BID With Meals    ibuprofen (MOTRIN) tablet 600 mg  600 mg Oral Q6H PRN    lactated ringers infusion  75 mL/hr Intravenous Continuous    levothyroxine tablet 25 mcg  25 mcg Oral Early Morning    magnesium sulfate 20 g/500 mL infusion (premix)  2 g/hr Intravenous Continuous    NIFEdipine (PROCARDIA XL) 24 hr tablet 30 mg  30 mg Oral Daily    ondansetron (ZOFRAN) injection 4 mg  4 mg Intravenous Q8H PRN    simethicone (MYLICON) chewable tablet 80 mg  80 mg Oral 4x Daily PRN    sod citrate-citric acid (BICITRA) oral solution 30 mL  30 mL Oral 4x Daily PRN     Invasive Devices     Peripheral Intravenous Line            Peripheral IV 09/04/21 Dorsal (posterior); Left Hand <1 day                Assessment and Plan:  46y o  year-old [id number], postpartum day 6 status-post repeat LTCS and BS, readmitted for severe preeclampsia now HD #2  Hypertension in pregnancy, preeclampsia, severe, delivered/postpartum  Magnesium Sulfate started yesterday ~17:00 for seizure prophylaxis after pt received Procardia 10mg PO for severe BP  BP improved  Long acting Procardia XL 30mg PO was given at 18:30, due to SBP mostly 150s  At 22:15 pt with severe range BP and received Labetalol 20mg IV with improvement  This morning she received Procardia XL 30mg at 8:00  BP continues to be elevated in mild range, many with   Will continue to watch closely and if BP do not improve to  or below, will discuss w/ MFM and consider increase Procardia to 60mg  Pt diuresing well, swelling improved  Mild HA this morning, improved from overnight when received Fioricet  Pt feeling a little light headed as well    Hgb 10, no evidence blood loss  Likely side effect from magnesium  DTR normal w/o signs of magnesium toxicity  Magnesium to continue  Until 17:00 tonight  Will likely continue to monitor pt overnight for BP surveillance after Magnesium discontinued  Hypokalemia  Potassium on admission 3 4, pt asx  Today 2 4 - pt feeling a little light headed, likely due to magneisum  No N/V  Tolerating PO but only clears since yesterday  Will treat with 40mEq KDur and repeat level ~4 hours  Regular diet if tolerated  Anemia, postpartum  Hgb 10 2g/dl  Will continue PO iron as long as pt is tolerating      Acquired hypothyroidism  Continue 25mcg daily      David Gonzalez MD

## 2021-09-05 NOTE — NURSING NOTE
Nurse contacted pharmacy to verify compatibility of Labetalol and Magnesium Sulfate prior to administration  Compatibility confirmed

## 2021-09-05 NOTE — PLAN OF CARE
Problem: SAFETY ADULT  Goal: Patient will remain free of falls  Description: INTERVENTIONS:  - Educate patient/family on patient safety including physical limitations  - Instruct patient to call for assistance with activity   - Consult OT/PT to assist with strengthening/mobility   - Keep Call bell within reach  - Keep bed low and locked with side rails adjusted as appropriate  - Keep care items and personal belongings within reach  - Initiate and maintain comfort rounds  - Make Fall Risk Sign visible to staff  - Offer Toileting every  Hours, in advance of need  - Initiate/Maintain alarm  - Obtain necessary fall risk management equipment:   - Apply yellow socks and bracelet for high fall risk patients  - Consider moving patient to room near nurses station  Outcome: Progressing  Goal: Maintain or return to baseline ADL function  Description: INTERVENTIONS:  -  Assess patient's ability to carry out ADLs; assess patient's baseline for ADL function and identify physical deficits which impact ability to perform ADLs (bathing, care of mouth/teeth, toileting, grooming, dressing, etc )  - Assess/evaluate cause of self-care deficits   - Assess range of motion  - Assess patient's mobility; develop plan if impaired  - Assess patient's need for assistive devices and provide as appropriate  - Encourage maximum independence but intervene and supervise when necessary  - Involve family in performance of ADLs  - Assess for home care needs following discharge   - Consider OT consult to assist with ADL evaluation and planning for discharge  - Provide patient education as appropriate  Outcome: Progressing  Goal: Maintains/Returns to pre admission functional level  Description: INTERVENTIONS:  - Perform BMAT or MOVE assessment daily    - Set and communicate daily mobility goal to care team and patient/family/caregiver     - Collaborate with rehabilitation services on mobility goals if consulted  - Perform Range of Motion  times a day   - Reposition patient every  hours  - Dangle patient  times a day  - Stand patient  times a day  - Ambulate patient  times a day  - Out of bed to chair  times a day   - Out of bed for meals  times a day  - Out of bed for toileting  - Record patient progress and toleration of activity level   Outcome: Progressing     Problem: Knowledge Deficit  Goal: Patient/family/caregiver demonstrates understanding of disease process, treatment plan, medications, and discharge instructions  Description: Complete learning assessment and assess knowledge base    Interventions:  - Provide teaching at level of understanding  - Provide teaching via preferred learning methods  Outcome: Progressing     Problem: DISCHARGE PLANNING  Goal: Discharge to home or other facility with appropriate resources  Description: INTERVENTIONS:  - Identify barriers to discharge w/patient and caregiver  - Arrange for needed discharge resources and transportation as appropriate  - Identify discharge learning needs (meds, wound care, etc )  - Arrange for interpretive services to assist at discharge as needed  - Refer to Case Management Department for coordinating discharge planning if the patient needs post-hospital services based on physician/advanced practitioner order or complex needs related to functional status, cognitive ability, or social support system  Outcome: Progressing     Problem: POSTPARTUM  Goal: Experiences normal postpartum course  Description: INTERVENTIONS:  - Monitor maternal vital signs  - Assess uterine involution and lochia  Outcome: Progressing  Goal: Appropriate maternal -  bonding  Description: INTERVENTIONS:  - Identify family support  - Assess for appropriate maternal/infant bonding   -Encourage maternal/infant bonding opportunities  - Referral to  or  as needed  Outcome: Progressing  Goal: Establishment of infant feeding pattern  Description: INTERVENTIONS:  - Assess breast/bottle feeding  - Refer to lactation as needed  Outcome: Progressing  Goal: Incision(s), wounds(s) or drain site(s) healing without S/S of infection  Description: INTERVENTIONS  - Assess and document dressing, incision, wound bed, drain sites and surrounding tissue  - Provide patient and family education  - Perform skin care/dressing changes every   Outcome: Progressing

## 2021-09-05 NOTE — NURSING NOTE
Pt states she "feels shakey"  RN checked blood sugar which resulted as 116  RN provided juice and jello

## 2021-09-05 NOTE — PLAN OF CARE
Problem: SAFETY ADULT  Goal: Patient will remain free of falls  Description: INTERVENTIONS:  - Educate patient/family on patient safety including physical limitations  - Instruct patient to call for assistance with activity   - Consult OT/PT to assist with strengthening/mobility   - Keep Call bell within reach  - Keep bed low and locked with side rails adjusted as appropriate  - Keep care items and personal belongings within reach  - Initiate and maintain comfort rounds  - Make Fall Risk Sign visible to staff  - Obtain necessary fall risk management equipment:   - Apply yellow socks and bracelet for high fall risk patients  - Consider moving patient to room near nurses station  Outcome: Progressing  Goal: Maintain or return to baseline ADL function  Description: INTERVENTIONS:  -  Assess patient's ability to carry out ADLs; assess patient's baseline for ADL function and identify physical deficits which impact ability to perform ADLs (bathing, care of mouth/teeth, toileting, grooming, dressing, etc )  - Assess/evaluate cause of self-care deficits   - Assess range of motion  - Assess patient's mobility; develop plan if impaired  - Assess patient's need for assistive devices and provide as appropriate  - Encourage maximum independence but intervene and supervise when necessary  - Involve family in performance of ADLs  - Assess for home care needs following discharge   - Consider OT consult to assist with ADL evaluation and planning for discharge  - Provide patient education as appropriate  Outcome: Progressing  Goal: Maintains/Returns to pre admission functional level  Description: INTERVENTIONS:  - Perform BMAT or MOVE assessment daily    - Set and communicate daily mobility goal to care team and patient/family/caregiver     - Collaborate with rehabilitation services on mobility goals if consulted    - Out of bed for toileting  - Record patient progress and toleration of activity level   Outcome: Progressing    Problem: Knowledge Deficit  Goal: Patient/family/caregiver demonstrates understanding of disease process, treatment plan, medications, and discharge instructions  Description: Complete learning assessment and assess knowledge base    Interventions:  - Provide teaching at level of understanding  - Provide teaching via preferred learning methods  Outcome: Progressing     Problem: DISCHARGE PLANNING  Goal: Discharge to home or other facility with appropriate resources  Description: INTERVENTIONS:  - Identify barriers to discharge w/patient and caregiver  - Arrange for needed discharge resources and transportation as appropriate  - Identify discharge learning needs (meds, wound care, etc )  - Arrange for interpretive services to assist at discharge as needed  - Refer to Case Management Department for coordinating discharge planning if the patient needs post-hospital services based on physician/advanced practitioner order or complex needs related to functional status, cognitive ability, or social support system  Outcome: Progressing     Problem: POSTPARTUM  Goal: Experiences normal postpartum course  Description: INTERVENTIONS:  - Monitor maternal vital signs  - Assess uterine involution and lochia  Outcome: Progressing  Goal: Appropriate maternal -  bonding  Description: INTERVENTIONS:  - Identify family support  - Assess for appropriate maternal/infant bonding   -Encourage maternal/infant bonding opportunities  - Referral to  or  as needed  Outcome: Progressing  Goal: Establishment of infant feeding pattern  Description: INTERVENTIONS:  - Assess breast/bottle feeding  - Refer to lactation as needed  Outcome: Progressing  Goal: Incision(s), wounds(s) or drain site(s) healing without S/S of infection  Description: INTERVENTIONS  - Assess and document dressing, incision, wound bed, drain sites and surrounding tissue  - Provide patient and family education    Outcome: Progressing

## 2021-09-05 NOTE — NURSING NOTE
Provider notified for elevated BP of 161/95  Pt also reports unrelieved headache at 7/10 pain  Nurse instructed to place verbal order w/ co-sign for 20 mg Labetalol intravenous and recheck BP every 10 minutes for 1 hour per protocol  Will notify provider of elevated pressures and unrelieved pain

## 2021-09-05 NOTE — NURSING NOTE
Provider notified of unrelieved headache at 4/10 pain after one hour  RN took verbal order w/ co-sign for 1 tablet of Fioricet now

## 2021-09-05 NOTE — UTILIZATION REVIEW
Initial Clinical Review    OBS 9/4 @ 1826 UPGRADED TO INPATIENT 9/5 @ 3550 [address] POST-PARTUM    09/05/21 1055  Inpatient Admission Once     Transfer Service: OB/GYN       Question Answer Comment   Level of Care Med Surg        09/05/21 1055     Initial Presentation: 46 y o  yo [id number] who is 5 days post partum and post op from repeat LTCS and b/l salpingectomy on 8/30/2021 at [facility]   Her admission delivery was complicated by mild preeclampsia diagnosed at delivery and postpartum hemorrhage with anemia  She did well postop and her BP's where only elevated in the mild range 130-140s/70-90s upon discharge  Her postop hgb was 8 2g/dl and she was asymptomatic  On d/c she was instructed to monitor her BP's and report if >160/110 or developed symptoms of severe preeclampsia  She called her doctor today reporting her BP at home was Systolic 852-000'[id number], and mild headache this AM  Also reports LE edema which has worsened since d/c  Admit observation to L&D with pre-eclampsia with severe features   Plan:      1) Hypertension in pregnancy, preeclampsia, severe, delivered/postpartum  Patient with mild preeclampsia at delivery and d/c home POD#3 with mildly elevated BP  She returned today asx but BP elevated 160s/90s at home  First /96,  Then 185/94  She received Procardia 10mg after 2nd BP, next /78, then 159/84  Magnesium sulfate was started for seizure prophylaxis  Will continue to monitor and start Procardia XL if persist >150/90      Discussed w/ pt and  dx of severe preelcampsia based on blood pressures and risk of progression to eclampsia  Recommend 24 hours of IV magnesium sulfate for seizure prophylaxis  Discussed risks, benefits, alternatives  VS per protocol, strict I/O (bathroom privileges okay as long as not neuro symptoms), clear fluid diet, may progress to house diet if pt is hungry and no neuro symptoms)    Repeat labs in morning      2) Anemia, postpartum  Current hgb 8 9g/dl  Will continue PO iron as long as pt is tolerating     3) Acquired hypothyroidism  Continue 25mcg daily     4) s/p   Motrin and Tylenol prn pain    Date:   -- upgraded to inpatient status --  Magnesium Sulfate started yesterday ~17:00 for seizure ppx after received Procardia 10mg PO for severe BP  BP improved  Long acting Procardia XL 30mg PO was given at 18:30, due to SBP mostly 150s  At 22:15 pt with severe range BP and received Labetalol 20mg IV with improvement  This morning she received Procardia XL 30mg at 8:00  BP continues to be elevated in mild range, many with   Will continue to watch closely and if BP do not improve to  or below, will discuss w/ MFM and consider increase Procardia to 60mg  Pt diuresing well, swelling improved  Mild HA this morning, improved from overnight when received Fioricet  Pt feeling a little light headed as well  Hgb 10, no evidence blood loss  Likely side effect from magnesium  DTR normal w/o signs of magnesium toxicity  Mag to continue until 17:00 tonight  Continue to monitor pt overnight for BP surveillance after Magnesium discontinued  K 2 4 today, feeling a little lightheaded d/t mag but suresh po, only clears  Give 40 mEq KDur and repeat level in 4 hrs  Reg diet if suresh  Hgb 10 2, continue po iron       ED Triage Vitals   Temperature Pulse Respirations Blood Pressure SpO2   21 1425 21 1425 21 1425 21 1425 21 1610   (!) 97 °F (36 1 °C) 86 18 (!) 180/96 99 %      Temp Source Heart Rate Source Patient Position - Orthostatic VS BP Location FiO2 (%)   21 1425 21 1425 21 1532 21 1532 --   Temporal Monitor Sitting Right arm       Pain Score       21 1425       No Pain          Wt Readings from Last 1 Encounters:   21 94 8 kg (209 lb)     Additional Vital Signs:   Date/Time  Temp  Pulse  Resp  BP  MAP (mmHg)  SpO2  O2 Device  Cardiac (WDL)  Patient Position - Orthostatic VS   09/05/21 0800  99 5 °F (37 5 °C)  83  18  152/91  --  96 %  None (Room air)  WDL  --   09/05/21 0600  98 °F (36 7 °C)  79  18  157/93  108  96 %  None (Room air)  --  Sitting   09/05/21 0500  --  78  18  137/82  93  97 %  None (Room air)  --  Sitting   09/05/21 0400  98 °F (36 7 °C)  78  18  143/78  89  97 %  None (Room air)  --  Sitting   09/05/21 0300  --  84  18  145/88  102  97 %  None (Room air)  --  Sitting   09/05/21 0200  97 8 °F (36 6 °C)  80  18  143/77  94  97 %  None (Room air)  --  Sitting   09/05/21 0125  --  79  18  146/87  98  100 %  None (Room air)  --  Sitting   09/05/21 0055  --  80  18  153/89  102  98 %  None (Room air)  --  Sitting   09/05/21 0025  --  78  18  147/88  102  97 %  None (Room air)  --  Sitting   09/05/21 0010  --  76  18  145/84  97  97 %  None (Room air)  --  Sitting   09/04/21 2355  98 °F (36 7 °C)  76  18  136/75  91  96 %  None (Room air)  --  Sitting   09/04/21 2340  --  77  18  136/85  98  96 %  None (Room air)  --  Sitting   09/04/21 2326  --  76  18  139/82  99  98 %  None (Room air)  --  Sitting   09/04/21 2316  --  77  18  142/84  100  96 %  None (Room air)  --  Sitting   09/04/21 2306  --  81  18  149/89  98  98 %  None (Room air)  --  Sitting   09/04/21 2300  98 °F (36 7 °C)  --  --  --  --  94 %  None (Room air)  --  --   09/04/21 2256  --  76  18  144/91  --  94 %  None (Room air)  --  Sitting   09/04/21 2246  --  75  18  143/91  --  96 %  None (Room air)  --  Sitting   09/04/21 2236  --  75  18  141/98  --  96 %  None (Room air)  --  Sitting   09/04/21 2200  98 2 °F (36 8 °C)  82  18  161/95  116  98 %  None (Room air)  --  Sitting   09/04/21 2000  98 7 °F (37 1 °C)  85  18  --  --  98 %  None (Room air)  WDL  --   09/04/21 1932  --  --  --  142/85  103  --  --  --  Sitting   09/04/21 1815  --  80  --  162/90  --  --  --  --  --   09/04/21 1800  98 °F (36 7 °C)  88  18  161/95   --  99 %  --  --  Sitting   BP: will recheck in 15 minutes at 09/04/21 1800   09/04/21 1636  --  93  18  152/87  --  --  --  --  --   09/04/21 1610  98 6 °F (37 °C)  83  18  159/86  --  99 %  --  --  Sitting   09/04/21 1600  98 6 °F (37 °C)  --  18  --  --  --  None (Room air)  --  --   09/04/21 1556  --  --  --  --  --  --  None (Room air)  WDL  --   09/04/21 1532  98 6 °F (37 °C)  84  18  163/92  --  --  --  --  Sitting   09/04/21 1515  --  99  --  159/84  --  --  --  --  --   09/04/21 1457  --  96  --  161/78  --  --  --  --  --   09/04/21 1442  --  81  --  185/94Abnormal   --  --  --  --  --   09/04/21 1425  97 °F (36 1 °C)Abnormal   86  18  180/96Abnormal    --  --  --  --  --         Pertinent Labs/Diagnostic Test Results:     Results from last 7 days   Lab Units 09/05/21  0603 09/04/21  1432   WBC Thousand/uL 9 31 9 13   HEMOGLOBIN g/dL 10 2* 8 9*   HEMATOCRIT % 31 3* 28 3*   PLATELETS Thousands/uL 358 300     Results from last 7 days   Lab Units 09/05/21 0603 09/04/21  1432   SODIUM mmol/L 141 145   POTASSIUM mmol/L 2 5* 3 4*   CHLORIDE mmol/L 101 107   CO2 mmol/L 31 26   ANION GAP mmol/L 9 12   BUN mg/dL 5 9   CREATININE mg/dL 0 56* 0 65   EGFR ml/min/1 73sq m 118 112   CALCIUM mg/dL 7 2* 8 3     Results from last 7 days   Lab Units 09/05/21  0603 09/04/21  1432   AST U/L 39 42   ALT U/L 47 46   ALK PHOS U/L 101 87   TOTAL PROTEIN g/dL 7 1 6 3*   ALBUMIN g/dL 3 0* 2 6*   TOTAL BILIRUBIN mg/dL 0 60 0 40     Results from last 7 days   Lab Units 09/05/21  0018   POC GLUCOSE mg/dl 116     Results from last 7 days   Lab Units 09/05/21  0603 09/04/21  1432   GLUCOSE RANDOM mg/dL 92 77       Results from last 7 days   Lab Units 09/04/21  1432   CREATININE UR mg/dL 14 3   PROTEIN UR mg/dL 58   PROT/CREAT RATIO UR  4 06*       Past Medical History:   Diagnosis Date    Anemia     Gestational hypertension     Hidradenitis     groin area    HPV (human papilloma virus) infection 2010    Hypothyroid     Papanicolaou smear 02/2018     Present on Admission:   Anemia, postpartum      Admitting Diagnosis: Hypertension [I10]  Age/Sex: 46 y o  female  Admission Orders:  Scheduled Medications:  docusate sodium, 100 mg, Oral, BID  ferrous sulfate, 325 mg, Oral, BID With Meals  levothyroxine, 25 mcg, Oral, Early Morning  NIFEdipine, 30 mg, Oral, Daily    Continuous IV Infusions:  lactated ringers, 75 mL/hr, Intravenous, Continuous  magnesium sulfate, 2 g/hr, Intravenous, Continuous    PRN Meds:  acetaminophen, 650 mg, Oral, Q6H PRN  calcium gluconate, 1 g, Intravenous, Once PRN  ibuprofen, 600 mg, Oral, Q6H PRN  ondansetron, 4 mg, Intravenous, Q8H PRN  simethicone, 80 mg, Oral, 4x Daily PRN  sod citrate-citric acid, 30 mL, Oral, 4x Daily PRN          Network Utilization Review Department  ATTENTION: Please call with any questions or concerns to [phone] and carefully listen to the prompts so that you are directed to the right person  All voicemails are confidential   Frances De La Cruz all requests for admission clinical reviews, approved or denied determinations and any other requests to dedicated fax number below belonging to the campus where the patient is receiving treatment   List of dedicated fax numbers for the Facilities:  [address] DENIALS (Administrative/Medical Necessity) [phone]   [address] (Maternity/NICU/Pediatrics) [phone]   [address] [place] [address] [address] [place] [address] Jean-Paul Leann Cl[address] [facility] [phone]

## 2021-09-05 NOTE — ASSESSMENT & PLAN NOTE
Potassium on admission 3 4, pt asx  Today 2 4 - pt feeling a little light headed, likely due to magneisum  No N/V  Tolerating PO but only clears since yesterday  Will treat with 40mEq KDur and repeat level ~4 hours  Regular diet if tolerated

## 2021-09-05 NOTE — PROGRESS NOTES
Reviewed BP w/ Dr Mai Rankin [facility]  Most 150/90s  He recom add an additional Procardia XL 30mg now  Continue to monitor  If remain elevated can add Procardia XL 30mg at 8PM, max dosing 60mg bid  Nurse notified of order  I increased dose for 9/6/2021 AM to Procardia XL 60mg        Vitals:    09/05/21 1001 09/05/21 1100 09/05/21 1158 09/05/21 1253   BP: 156/92 148/88 155/93 151/91   BP Location:       Pulse: 91  84 83   Resp: 18  18    Temp:       TempSrc:       SpO2: 96%  96%    Height:           Kalpana Junior MD

## 2021-09-06 LAB
ANION GAP SERPL CALCULATED.3IONS-SCNC: 10 MMOL/L (ref 4–13)
ANION GAP SERPL CALCULATED.3IONS-SCNC: 8 MMOL/L (ref 4–13)
BUN SERPL-MCNC: 10 MG/DL (ref 5–25)
BUN SERPL-MCNC: 7 MG/DL (ref 5–25)
CALCIUM SERPL-MCNC: 7.8 MG/DL (ref 8.3–10.1)
CALCIUM SERPL-MCNC: 8.7 MG/DL (ref 8.3–10.1)
CHLORIDE SERPL-SCNC: 103 MMOL/L (ref 100–108)
CHLORIDE SERPL-SCNC: 104 MMOL/L (ref 100–108)
CO2 SERPL-SCNC: 28 MMOL/L (ref 21–32)
CO2 SERPL-SCNC: 29 MMOL/L (ref 21–32)
CREAT SERPL-MCNC: 0.61 MG/DL (ref 0.6–1.3)
CREAT SERPL-MCNC: 0.8 MG/DL (ref 0.6–1.3)
GFR SERPL CREATININE-BSD FRML MDRD: 115 ML/MIN/1.73SQ M
GFR SERPL CREATININE-BSD FRML MDRD: 93 ML/MIN/1.73SQ M
GLUCOSE SERPL-MCNC: 101 MG/DL (ref 65–140)
GLUCOSE SERPL-MCNC: 83 MG/DL (ref 65–140)
POTASSIUM SERPL-SCNC: 3.4 MMOL/L (ref 3.5–5.3)
POTASSIUM SERPL-SCNC: 3.5 MMOL/L (ref 3.5–5.3)
SODIUM SERPL-SCNC: 141 MMOL/L (ref 136–145)
SODIUM SERPL-SCNC: 141 MMOL/L (ref 136–145)

## 2021-09-06 PROCEDURE — 80048 BASIC METABOLIC PNL TOTAL CA: CPT | Performed by: OBSTETRICS & GYNECOLOGY

## 2021-09-06 PROCEDURE — NC001 PR NO CHARGE: Performed by: OBSTETRICS & GYNECOLOGY

## 2021-09-06 PROCEDURE — 99232 SBSQ HOSP IP/OBS MODERATE 35: CPT | Performed by: OBSTETRICS & GYNECOLOGY

## 2021-09-06 RX ORDER — LABETALOL 200 MG/1
200 TABLET, FILM COATED ORAL EVERY 12 HOURS SCHEDULED
Status: DISCONTINUED | OUTPATIENT
Start: 2021-09-06 | End: 2021-09-07

## 2021-09-06 RX ORDER — POTASSIUM CHLORIDE 20 MEQ/1
40 TABLET, EXTENDED RELEASE ORAL ONCE
Status: COMPLETED | OUTPATIENT
Start: 2021-09-06 | End: 2021-09-06

## 2021-09-06 RX ADMIN — NIFEDIPINE 60 MG: 30 TABLET, FILM COATED, EXTENDED RELEASE ORAL at 08:29

## 2021-09-06 RX ADMIN — DOCUSATE SODIUM 100 MG: 100 CAPSULE, LIQUID FILLED ORAL at 17:10

## 2021-09-06 RX ADMIN — FERROUS SULFATE TAB 325 MG (65 MG ELEMENTAL FE) 325 MG: 325 (65 FE) TAB at 08:01

## 2021-09-06 RX ADMIN — DOCUSATE SODIUM 100 MG: 100 CAPSULE, LIQUID FILLED ORAL at 08:27

## 2021-09-06 RX ADMIN — IBUPROFEN 600 MG: 600 TABLET ORAL at 01:01

## 2021-09-06 RX ADMIN — LEVOTHYROXINE SODIUM 25 MCG: 25 TABLET ORAL at 05:51

## 2021-09-06 RX ADMIN — POTASSIUM CHLORIDE 40 MEQ: 1500 TABLET, EXTENDED RELEASE ORAL at 09:03

## 2021-09-06 RX ADMIN — LABETALOL HYDROCHLORIDE 200 MG: 200 TABLET, FILM COATED ORAL at 13:51

## 2021-09-06 RX ADMIN — FERROUS SULFATE TAB 325 MG (65 MG ELEMENTAL FE) 325 MG: 325 (65 FE) TAB at 17:10

## 2021-09-06 NOTE — PROGRESS NOTES
Spoke with Cardiologist regarding patient's blood pressure management  Advised by Dr Malina De La Torre to continue with Procardia 60 mg P O  in AM and add labetalol 200 mg P O  BID

## 2021-09-06 NOTE — PLAN OF CARE
Problem: SAFETY ADULT  Goal: Patient will remain free of falls  Description: INTERVENTIONS:  - Educate patient/family on patient safety including physical limitations  - Instruct patient to call for assistance with activity   - Consult OT/PT to assist with strengthening/mobility   - Keep Call bell within reach  - Keep bed low and locked with side rails adjusted as appropriate  - Keep care items and personal belongings within reach  - Initiate and maintain comfort rounds  - Make Fall Risk Sign visible to staff  - Apply yellow socks and bracelet for high fall risk patients  - Consider moving patient to room near nurses station  Outcome: Progressing  Goal: Maintain or return to baseline ADL function  Description: INTERVENTIONS:  -  Assess patient's ability to carry out ADLs; assess patient's baseline for ADL function and identify physical deficits which impact ability to perform ADLs (bathing, care of mouth/teeth, toileting, grooming, dressing, etc )  - Assess/evaluate cause of self-care deficits   - Assess range of motion  - Assess patient's mobility; develop plan if impaired  - Assess patient's need for assistive devices and provide as appropriate  - Encourage maximum independence but intervene and supervise when necessary  - Involve family in performance of ADLs  - Assess for home care needs following discharge   - Consider OT consult to assist with ADL evaluation and planning for discharge  - Provide patient education as appropriate  Outcome: Progressing  Goal: Maintains/Returns to pre admission functional level  Description: INTERVENTIONS:  - Perform BMAT or MOVE assessment daily    - Set and communicate daily mobility goal to care team and patient/family/caregiver     - Out of bed for toileting  - Record patient progress and toleration of activity level   Outcome: Progressing     Problem: Knowledge Deficit  Goal: Patient/family/caregiver demonstrates understanding of disease process, treatment plan, medications, and discharge instructions  Description: Complete learning assessment and assess knowledge base    Interventions:  - Provide teaching at level of understanding  - Provide teaching via preferred learning methods  Outcome: Progressing     Problem: DISCHARGE PLANNING  Goal: Discharge to home or other facility with appropriate resources  Description: INTERVENTIONS:  - Identify barriers to discharge w/patient and caregiver  - Arrange for needed discharge resources and transportation as appropriate  - Identify discharge learning needs (meds, wound care, etc )  - Arrange for interpretive services to assist at discharge as needed  - Refer to Case Management Department for coordinating discharge planning if the patient needs post-hospital services based on physician/advanced practitioner order or complex needs related to functional status, cognitive ability, or social support system  Outcome: Progressing     Problem: POSTPARTUM  Goal: Experiences normal postpartum course  Description: INTERVENTIONS:  - Monitor maternal vital signs  - Assess uterine involution and lochia  Outcome: Progressing  Goal: Appropriate maternal -  bonding  Description: INTERVENTIONS:  - Identify family support  - Assess for appropriate maternal/infant bonding   -Encourage maternal/infant bonding opportunities  - Referral to  or  as needed  Outcome: Progressing  Goal: Establishment of infant feeding pattern  Description: INTERVENTIONS:  - Assess breast/bottle feeding  - Refer to lactation as needed  Outcome: Progressing  Goal: Incision(s), wounds(s) or drain site(s) healing without S/S of infection  Description: INTERVENTIONS  - Assess and document dressing, incision, wound bed, drain sites and surrounding tissue  - Provide patient and family education  Outcome: Progressing

## 2021-09-06 NOTE — PROGRESS NOTES
Hypokalemia:    After 3 doses of 40mEq K Dur, potassium level is 3 3 from 2 4 earlier today  Pt is asx  No vomiting or diarrhea  I/O -6000cc yesterday - possible renal loss  Calcium also low, likely due to recent magnesium  Will give K Dur 40mEq now  Repeat BMP in morning  Sodium   Date Value Ref Range Status   09/05/2021 141 136 - 145 mmol/L Final     Potassium   Date Value Ref Range Status   09/05/2021 3 2 (L) 3 5 - 5 3 mmol/L Final   09/05/2021 3 3 (L) 3 5 - 5 3 mmol/L Final     Glucose   Date Value Ref Range Status   09/05/2021 104 65 - 140 mg/dL Final     Comment:     If the patient is fasting, the ADA then defines impaired fasting glucose as > 100 mg/dL and diabetes as > or equal to 123 mg/dL  Specimen collection should occur prior to Sulfasalazine administration due to the potential for falsely depressed results  Specimen collection should occur prior to Sulfapyridine administration due to the potential for falsely elevated results       Chloride   Date Value Ref Range Status   09/05/2021 103 100 - 108 mmol/L Final     CO2   Date Value Ref Range Status   09/05/2021 28 21 - 32 mmol/L Final     BUN   Date Value Ref Range Status   09/05/2021 8 5 - 25 mg/dL Final     Creatinine   Date Value Ref Range Status   09/05/2021 0 75 0 60 - 1 30 mg/dL Final     Comment:     Standardized to IDMS reference method     Calcium   Date Value Ref Range Status   09/05/2021 7 4 (L) 8 3 - 10 1 mg/dL Final     Carlos Arriaga MD

## 2021-09-06 NOTE — PROGRESS NOTES
Patient seen and examined in AM today  Patient is Post-op day # 7 from repeat LTCS and b/l salpingectomies, performed on 8/30/2021  Pt was readmitted on 8/4/2021 with severe pre-eclampsia and treated with Magnesium Sulfate for seizure prophylaxis,then started on Procardia for blood pressure management  Pt is now HD #3  Denies having any HA, change in vision, N/V or RUQ pain  Pain: controlled  Tolerating Oral Intake: yes  Voiding: yes  Flatus: yes  BM- yes  Ambulating: yes, without difficulty  Breastfeeding:well  Shortness of Breath: no  Chest pain- no  Lochia: minimal    Objective:   Vitals:    09/06/21 1154   BP: 155/93   Pulse:    Resp:    Temp:    SpO2:        Intake/Output Summary (Last 24 hours) at 9/6/2021 1221  Last data filed at 9/6/2021 0604  Gross per 24 hour   Intake 4182 58 ml   Output 4350 ml   Net -167 42 ml       Physical Exam:  General: a/a/o x 3 no acute distress  Cardiovascular: RRR  Lungs: clear  Abdomen: soft, nontender, minimal distention, normal BS  Fundus: below umbilicus  Incision: clean, dry, intact  Lower extremeties: minimal edema no signs DVT      Assessment and Plan:  46y o  year-old [id number], postpartum day 7 status-post repeat LTCS and BS, readmitted for severe preeclampsia now HD #3      Hypertension in pregnancy, preeclampsia, severe, delivered/postpartum  Magnesium Sulfate stopped yesterday after treating for 24 hours for seizure prophylaxis  Pt's blood pressures managed with Procardia which has been gradually increased to 30 mg in PM yesterday and  Procardia 60mg PO in AM today for severe BPs  BP improved but continue to be labile  Cardiology consult ordered for management of patient's blood pressure as inpatient management and also for out patient management upon patient's discharge      Pt diuresing well with significant decrease in swelling       Will continue to monitor patient today and overnight for BP surveillance      Hypokalemia  Potassium on admission 3 4, pt asx  Yesterday 2 4 - Pt was treated with 40mEq KDur x 3 doses yesterday and one dose of KDur today  Will check potassium level ~4 hours from previous P O  dose  Regular diet if tolerated      Anemia, postpartum  Hgb 10 2g/dl    Will continue PO iron      Acquired hypothyroidism  Continue 25mcg daily       Mercado Faster, DO

## 2021-09-06 NOTE — PROGRESS NOTES
Blood pressures:  Magnesium discontinued, patient feeling better  HA completely resolved  Vitals:    09/05/21 1500 09/05/21 1701 09/05/21 1900 09/05/21 2100   BP: 150/91 140/85 151/93 151/94   BP Location: Right arm  Right arm Right arm   Pulse: 100 84 99 93   Resp: 18 18 18 18   Temp: 99 8 °F (37 7 °C)  99 7 °F (37 6 °C) 98 8 °F (37 1 °C)   TempSrc:   Temporal Temporal   SpO2: 99% 97% 99%    Height:           Pt received Procardia 30mg PO at 8:00    - additional Procardia XL 30mg PO at 13:00     BP continue to be 150s/90s consistent  Will add additional Procardia XL 30mg PO now  In AM to received Procardia 60mg PO  Hypokalemia:  Pt has received K Dur 40mEq x 3  Potassium increasing appropriately  Next check due now      If normal, will repeat in Kim Holguin MD

## 2021-09-07 VITALS
HEART RATE: 84 BPM | HEIGHT: 65 IN | TEMPERATURE: 99.2 F | DIASTOLIC BLOOD PRESSURE: 98 MMHG | RESPIRATION RATE: 18 BRPM | BODY MASS INDEX: 34.78 KG/M2 | OXYGEN SATURATION: 99 % | SYSTOLIC BLOOD PRESSURE: 149 MMHG

## 2021-09-07 PROBLEM — E87.6 HYPOKALEMIA: Status: RESOLVED | Noted: 2021-09-05 | Resolved: 2021-09-07

## 2021-09-07 PROCEDURE — NC001 PR NO CHARGE: Performed by: STUDENT IN AN ORGANIZED HEALTH CARE EDUCATION/TRAINING PROGRAM

## 2021-09-07 PROCEDURE — 99238 HOSP IP/OBS DSCHRG MGMT 30/<: CPT | Performed by: STUDENT IN AN ORGANIZED HEALTH CARE EDUCATION/TRAINING PROGRAM

## 2021-09-07 PROCEDURE — 99222 1ST HOSP IP/OBS MODERATE 55: CPT | Performed by: INTERNAL MEDICINE

## 2021-09-07 RX ORDER — NIFEDIPINE 90 MG/1
90 TABLET, EXTENDED RELEASE ORAL DAILY
Qty: 30 TABLET | Refills: 1 | Status: SHIPPED | OUTPATIENT
Start: 2021-09-08 | End: 2021-10-12 | Stop reason: SDUPTHER

## 2021-09-07 RX ORDER — ACETAMINOPHEN 325 MG/1
650 TABLET ORAL EVERY 6 HOURS PRN
Refills: 0
Start: 2021-09-07 | End: 2021-10-12 | Stop reason: ALTCHOICE

## 2021-09-07 RX ORDER — DOCUSATE SODIUM 100 MG/1
100 CAPSULE, LIQUID FILLED ORAL 2 TIMES DAILY
Refills: 0
Start: 2021-09-07 | End: 2021-10-12 | Stop reason: ALTCHOICE

## 2021-09-07 RX ORDER — LABETALOL 200 MG/1
200 TABLET, FILM COATED ORAL ONCE
Status: COMPLETED | OUTPATIENT
Start: 2021-09-07 | End: 2021-09-07

## 2021-09-07 RX ORDER — LABETALOL 200 MG/1
400 TABLET, FILM COATED ORAL EVERY 12 HOURS SCHEDULED
Qty: 120 TABLET | Refills: 1 | Status: SHIPPED | OUTPATIENT
Start: 2021-09-08 | End: 2021-10-12 | Stop reason: ALTCHOICE

## 2021-09-07 RX ORDER — NIFEDIPINE 30 MG/1
90 TABLET, EXTENDED RELEASE ORAL DAILY
Status: DISCONTINUED | OUTPATIENT
Start: 2021-09-08 | End: 2021-09-07 | Stop reason: HOSPADM

## 2021-09-07 RX ORDER — LABETALOL 200 MG/1
400 TABLET, FILM COATED ORAL EVERY 12 HOURS SCHEDULED
Status: DISCONTINUED | OUTPATIENT
Start: 2021-09-08 | End: 2021-09-07 | Stop reason: HOSPADM

## 2021-09-07 RX ORDER — IBUPROFEN 600 MG/1
600 TABLET ORAL EVERY 6 HOURS PRN
Qty: 30 TABLET | Refills: 0
Start: 2021-09-07 | End: 2021-10-12 | Stop reason: ALTCHOICE

## 2021-09-07 RX ORDER — NIFEDIPINE 30 MG/1
30 TABLET, EXTENDED RELEASE ORAL ONCE
Status: COMPLETED | OUTPATIENT
Start: 2021-09-07 | End: 2021-09-07

## 2021-09-07 RX ADMIN — LABETALOL HYDROCHLORIDE 200 MG: 200 TABLET, FILM COATED ORAL at 13:37

## 2021-09-07 RX ADMIN — LABETALOL HYDROCHLORIDE 200 MG: 200 TABLET, FILM COATED ORAL at 15:06

## 2021-09-07 RX ADMIN — LABETALOL HYDROCHLORIDE 200 MG: 200 TABLET, FILM COATED ORAL at 02:08

## 2021-09-07 RX ADMIN — NIFEDIPINE 30 MG: 30 TABLET, FILM COATED, EXTENDED RELEASE ORAL at 11:21

## 2021-09-07 RX ADMIN — DOCUSATE SODIUM 100 MG: 100 CAPSULE, LIQUID FILLED ORAL at 08:18

## 2021-09-07 RX ADMIN — LEVOTHYROXINE SODIUM 25 MCG: 25 TABLET ORAL at 06:01

## 2021-09-07 RX ADMIN — NIFEDIPINE 60 MG: 30 TABLET, FILM COATED, EXTENDED RELEASE ORAL at 08:18

## 2021-09-07 RX ADMIN — FERROUS SULFATE TAB 325 MG (65 MG ELEMENTAL FE) 325 MG: 325 (65 FE) TAB at 08:18

## 2021-09-07 NOTE — PLAN OF CARE
Problem: SAFETY ADULT  Goal: Patient will remain free of falls  Description: INTERVENTIONS:  - Educate patient/family on patient safety including physical limitations  - Instruct patient to call for assistance with activity   - Consult OT/PT to assist with strengthening/mobility   - Keep Call bell within reach  - Keep bed low and locked with side rails adjusted as appropriate  - Keep care items and personal belongings within reach  - Initiate and maintain comfort rounds  - Make Fall Risk Sign visible to staff        - Apply yellow socks and bracelet for high fall risk patients  - Consider moving patient to room near nurses station  Outcome: Progressing  Goal: Maintain or return to baseline ADL function  Description: INTERVENTIONS:  -  Assess patient's ability to carry out ADLs; assess patient's baseline for ADL function and identify physical deficits which impact ability to perform ADLs (bathing, care of mouth/teeth, toileting, grooming, dressing, etc )  - Assess/evaluate cause of self-care deficits   - Assess range of motion  - Assess patient's mobility; develop plan if impaired  - Assess patient's need for assistive devices and provide as appropriate  - Encourage maximum independence but intervene and supervise when necessary  - Involve family in performance of ADLs  - Assess for home care needs following discharge   - Consider OT consult to assist with ADL evaluation and planning for discharge  - Provide patient education as appropriate  Outcome: Progressing  Goal: Maintains/Returns to pre admission functional level  Description: INTERVENTIONS:  - Perform BMAT or MOVE assessment daily    - Set and communicate daily mobility goal to care team and patient/family/caregiver     - Collaborate with rehabilitation services on mobility goals if consulted  -   - - Out of bed for toileting  - Record patient progress and toleration of activity level   Outcome: Progressing     Problem: Knowledge Deficit  Goal: Patient/family/caregiver demonstrates understanding of disease process, treatment plan, medications, and discharge instructions  Description: Complete learning assessment and assess knowledge base    Interventions:  - Provide teaching at level of understanding  - Provide teaching via preferred learning methods  Outcome: Progressing     Problem: DISCHARGE PLANNING  Goal: Discharge to home or other facility with appropriate resources  Description: INTERVENTIONS:  - Identify barriers to discharge w/patient and caregiver  - Arrange for needed discharge resources and transportation as appropriate  - Identify discharge learning needs (meds, wound care, etc )  - Arrange for interpretive services to assist at discharge as needed  - Refer to Case Management Department for coordinating discharge planning if the patient needs post-hospital services based on physician/advanced practitioner order or complex needs related to functional status, cognitive ability, or social support system  Outcome: Progressing     Problem: POSTPARTUM  Goal: Experiences normal postpartum course  Description: INTERVENTIONS:  - Monitor maternal vital signs  - Assess uterine involution and lochia  Outcome: Progressing  Goal: Appropriate maternal -  bonding  Description: INTERVENTIONS:  - Identify family support  - Assess for appropriate maternal/infant bonding   -Encourage maternal/infant bonding opportunities  - Referral to  or  as needed  Outcome: Progressing  Goal: Establishment of infant feeding pattern  Description: INTERVENTIONS:  - Assess breast/bottle feeding  - Refer to lactation as needed  Outcome: Progressing  Goal: Incision(s), wounds(s) or drain site(s) healing without S/S of infection  Description: INTERVENTIONS  - Assess and document dressing, incision, wound bed, drain sites and surrounding tissue  - Provide patient and family education    Outcome: Progressing

## 2021-09-07 NOTE — PROGRESS NOTES
Post- Progress note    Patient is post-op day 8 from a Repeat Low Transverse  with bilateral salpingectomy, now readmitted for pre-eclampsia with severe features  Subjective:  Pain: no  Tolerating Oral Intake: yes  Voiding: yes  Ambulating: yes  Breastfeeding: Breastfeeding  Chest Pain: no  Shortness of Breath: no  RUQ pain: no  Changes in vision: no  Leg Pain/Discomfort: no  Lochia: normal    Objective:   Vitals:    21 1117   BP: 143/87   Pulse: 88   Resp: 16   Temp:    SpO2: 99%     No intake or output data in the 24 hours ending 21 1131    Physical Exam:  General: in no apparent distress, well developed and well nourished, in no respiratory distress and acyanotic, alert and oriented times 3  Cardiovascular: Cor RRR and No murmurs  Lungs: clear to auscultation bilaterally and no crackles, wheezes, rhonchi, or rales  Abdomen: abdomen is soft without significant tenderness, masses, organomegaly or guarding  No RUQ tenderness  Fundus: Firm and non-tender  Incision: clean, dry, and intact  Lower extremeties: nontender    Labs/Tests:   None today    Assessment and Plan:  46y o  year-old [id number], postoperative day 8 status-post Repeat Low Transverse  with bilateral salpingectomy readmitted for pre-eclampsia with severe features, now s/p magnesium sulfate for seizure prophylaxis  - No severe-range blood pressures in > 24 hours  No signs or symptoms of worsening pre-eclampsia  - Cardiology consulted yesterday given refractory blood pressures  As recommended by phone, labetalol 200mg PO BID was added yesterday  Additional 30mg of Procardia XL added this morning for total daily dosing of 90mg PO  Formal consult pending today (has been seen by NP, attending attestation pending)  - Signs and symptoms of worsening pre-eclampsia as well as BP parameters reviewed with patient and  at bedside today  Patient will continue to check BP at home at least twice daily     - Anticipate discharge to home today pending final Cardiology recommendations       Bridger Da Silva MD  9/7/2021 1:17 PM

## 2021-09-07 NOTE — UTILIZATION REVIEW
Inpatient Admission Authorization Request   NOTIFICATION OF INPATIENT ADMISSION/INPATIENT AUTHORIZATION REQUEST   SERVICING FACILITY:   [place] [address]  Tax ID: [id number]  NPI: [id number]  Place of Service: Inpatient [facility]  60[address]  Place of Service Code: 24     ATTENDING PROVIDER:  Attending Name and NPI#: Matteo Correa [[id number]]  Address: [address] [place] [address]  Phone: [phone]     UTILIZATION REVIEW CONTACT:  Dudley Marx Utilization   Network Utilization Review Department  Phone: [phone]  Fax [phone]  Email: [email or web address]     PHYSICIAN ADVISORY SERVICES:  FOR [id number] REVIEW - MEDICAL NECESSITY DENIAL  Phone: [phone]  Fax: [phone]  Email: [email or web address]     TYPE OF REQUEST:  Inpatient Status     ADMISSION INFORMATION:  ADMISSION DATE/TIME: 9/4/21  3:37 PM  PATIENT DIAGNOSIS CODE/DESCRIPTION:  Hypertension [I10]  DISCHARGE DATE/TIME: No discharge date for patient encounter  DISCHARGE DISPOSITION (IF DISCHARGED): Final discharge disposition not confirmed     IMPORTANT INFORMATION:  Please contact the Dudley Marx directly with any questions or concerns regarding this request  Department voicemails are confidential     Send requests for admission clinical reviews, concurrent reviews, approvals, and administrative denials due to lack of clinical to fax [phone]

## 2021-09-07 NOTE — UTILIZATION REVIEW
Initial Clinical Review    OBS 9/4 @ 1826 UPGRADED TO INPATIENT 9/5 @ 3550 [address] POST-PARTUM    09/05/21 1055  Inpatient Admission Once     Transfer Service: OB/GYN       Question Answer Comment   Level of Care Med Surg        09/05/21 1055     Initial Presentation: 46 y o  yo [id number] who is 5 days post partum and post op from repeat LTCS and b/l salpingectomy on 8/30/2021 at [facility]   Her admission delivery was complicated by mild preeclampsia diagnosed at delivery and postpartum hemorrhage with anemia  She did well postop and her BP's where only elevated in the mild range 130-140s/70-90s upon discharge  Her postop hgb was 8 2g/dl and she was asymptomatic  On d/c she was instructed to monitor her BP's and report if >160/110 or developed symptoms of severe preeclampsia  She called her doctor today reporting her BP at home was Systolic 286-394'B, and mild headache this AM  Also reports LE edema which has worsened since d/c  Admit observation to L&D with pre-eclampsia with severe features   Plan:      1) Hypertension in pregnancy, preeclampsia, severe, delivered/postpartum  Patient with mild preeclampsia at delivery and d/c home POD#3 with mildly elevated BP  She returned today asx but BP elevated 160s/90s at home  First /96,  Then 185/94  She received Procardia 10mg after 2nd BP, next /78, then 159/84  Magnesium sulfate was started for seizure prophylaxis  Will continue to monitor and start Procardia XL if persist >150/90      Discussed w/ pt and  dx of severe preelcampsia based on blood pressures and risk of progression to eclampsia  Recommend 24 hours of IV magnesium sulfate for seizure prophylaxis  Discussed risks, benefits, alternatives  VS per protocol, strict I/O (bathroom privileges okay as long as not neuro symptoms), clear fluid diet, may progress to house diet if pt is hungry and no neuro symptoms)    Repeat labs in morning      2) Anemia, postpartum  Current hgb 8 9g/dl  Will continue PO iron as long as pt is tolerating     3) Acquired hypothyroidism  Continue 25mcg daily     4) s/p   Motrin and Tylenol prn pain    Date:   -- upgraded to inpatient status --  Magnesium Sulfate started yesterday ~17:00 for seizure ppx after received Procardia 10mg PO for severe BP  BP improved  Long acting Procardia XL 30mg PO was given at 18:30, due to SBP mostly 150s  At 22:15 pt with severe range BP and received Labetalol 20mg IV with improvement  This morning she received Procardia XL 30mg at 8:00  BP continues to be elevated in mild range, many with   Will continue to watch closely and if BP do not improve to  or below, will discuss w/ MFM and consider increase Procardia to 60mg  Pt diuresing well, swelling improved  Mild HA this morning, improved from overnight when received Fioricet  Pt feeling a little light headed as well  Hgb 10, no evidence blood loss  Likely side effect from magnesium  DTR normal w/o signs of magnesium toxicity  Mag to continue until 17:00 tonight  Continue to monitor pt overnight for BP surveillance after Magnesium discontinued  K 2 4 today, feeling a little lightheaded d/t mag but suresh po, only clears  Give 40 mEq KDur and repeat level in 4 hrs  Reg diet if suresh  Hgb 10 2, continue po iron       ED Triage Vitals   Temperature Pulse Respirations Blood Pressure SpO2   21 1425 21 1425 21 1425 21 1425 21 1610   (!) 97 °F (36 1 °C) 86 18 (!) 180/96 99 %      Temp Source Heart Rate Source Patient Position - Orthostatic VS BP Location FiO2 (%)   21 1425 21 1425 21 1532 21 1532 --   Temporal Monitor Sitting Right arm       Pain Score       21 1425       No Pain          Wt Readings from Last 1 Encounters:   21 94 8 kg (209 lb)     Additional Vital Signs:   Date/Time  Temp  Pulse  Resp  BP  MAP (mmHg)  SpO2  O2 Device  Cardiac (WDL)  Patient Position - Orthostatic VS   09/05/21 0800  99 5 °F (37 5 °C)  83  18  152/91  --  96 %  None (Room air)  WDL  --   09/05/21 0600  98 °F (36 7 °C)  79  18  157/93  108  96 %  None (Room air)  --  Sitting   09/05/21 0500  --  78  18  137/82  93  97 %  None (Room air)  --  Sitting   09/05/21 0400  98 °F (36 7 °C)  78  18  143/78  89  97 %  None (Room air)  --  Sitting   09/05/21 0300  --  84  18  145/88  102  97 %  None (Room air)  --  Sitting   09/05/21 0200  97 8 °F (36 6 °C)  80  18  143/77  94  97 %  None (Room air)  --  Sitting   09/05/21 0125  --  79  18  146/87  98  100 %  None (Room air)  --  Sitting   09/05/21 0055  --  80  18  153/89  102  98 %  None (Room air)  --  Sitting   09/05/21 0025  --  78  18  147/88  102  97 %  None (Room air)  --  Sitting   09/05/21 0010  --  76  18  145/84  97  97 %  None (Room air)  --  Sitting   09/04/21 2355  98 °F (36 7 °C)  76  18  136/75  91  96 %  None (Room air)  --  Sitting   09/04/21 2340  --  77  18  136/85  98  96 %  None (Room air)  --  Sitting   09/04/21 2326  --  76  18  139/82  99  98 %  None (Room air)  --  Sitting   09/04/21 2316  --  77  18  142/84  100  96 %  None (Room air)  --  Sitting   09/04/21 2306  --  81  18  149/89  98  98 %  None (Room air)  --  Sitting   09/04/21 2300  98 °F (36 7 °C)  --  --  --  --  94 %  None (Room air)  --  --   09/04/21 2256  --  76  18  144/91  --  94 %  None (Room air)  --  Sitting   09/04/21 2246  --  75  18  143/91  --  96 %  None (Room air)  --  Sitting   09/04/21 2236  --  75  18  141/98  --  96 %  None (Room air)  --  Sitting   09/04/21 2200  98 2 °F (36 8 °C)  82  18  161/95  116  98 %  None (Room air)  --  Sitting   09/04/21 2000  98 7 °F (37 1 °C)  85  18  --  --  98 %  None (Room air)  WDL  --   09/04/21 1932  --  --  --  142/85  103  --  --  --  Sitting   09/04/21 1815  --  80  --  162/90  --  --  --  --  --   09/04/21 1800  98 °F (36 7 °C)  88  18  161/95   --  99 %  --  --  Sitting   BP: will recheck in 15 minutes at 09/04/21 1800   09/04/21 1636  --  93  18  152/87  --  --  --  --  --   09/04/21 1610  98 6 °F (37 °C)  83  18  159/86  --  99 %  --  --  Sitting   09/04/21 1600  98 6 °F (37 °C)  --  18  --  --  --  None (Room air)  --  --   09/04/21 1556  --  --  --  --  --  --  None (Room air)  WDL  --   09/04/21 1532  98 6 °F (37 °C)  84  18  163/92  --  --  --  --  Sitting   09/04/21 1515  --  99  --  159/84  --  --  --  --  --   09/04/21 1457  --  96  --  161/78  --  --  --  --  --   09/04/21 1442  --  81  --  185/94Abnormal   --  --  --  --  --   09/04/21 1425  97 °F (36 1 °C)Abnormal   86  18  180/96Abnormal    --  --  --  --  --         Pertinent Labs/Diagnostic Test Results:     Results from last 7 days   Lab Units 09/05/21  0603 09/04/21  1432   WBC Thousand/uL 9 31 9 13   HEMOGLOBIN g/dL 10 2* 8 9*   HEMATOCRIT % 31 3* 28 3*   PLATELETS Thousands/uL 358 300     Results from last 7 days   Lab Units 09/06/21  1302 09/06/21  0556 09/05/21  2108 09/05/21  1705 09/05/21  1253 09/05/21  0603 09/04/21  1432   SODIUM mmol/L 141 141 141  --   --  141 145   POTASSIUM mmol/L 3 5 3 4* 3 3*  3 2* 3 2* 3 0* 2 5* 3 4*   CHLORIDE mmol/L 104 103 103  --   --  101 107   CO2 mmol/L 29 28 28  --   --  31 26   ANION GAP mmol/L 8 10 10  --   --  9 12   BUN mg/dL 10 7 8  --   --  5 9   CREATININE mg/dL 0 80 0 61 0 75  --   --  0 56* 0 65   EGFR ml/min/1 73sq m 93 115 101  --   --  118 112   CALCIUM mg/dL 8 7 7 8* 7 4*  --   --  7 2* 8 3     Results from last 7 days   Lab Units 09/05/21  0603 09/04/21  1432   AST U/L 39 42   ALT U/L 47 46   ALK PHOS U/L 101 87   TOTAL PROTEIN g/dL 7 1 6 3*   ALBUMIN g/dL 3 0* 2 6*   TOTAL BILIRUBIN mg/dL 0 60 0 40     Results from last 7 days   Lab Units 09/05/21  0018   POC GLUCOSE mg/dl 116     Results from last 7 days   Lab Units 09/06/21  1302 09/06/21  0556 09/05/21  2108 09/05/21  0603 09/04/21  1432   GLUCOSE RANDOM mg/dL 101 83 104 92 77       Results from last 7 days   Lab Units 09/04/21  1432 CREATININE UR mg/dL 14 3   PROTEIN UR mg/dL 58   PROT/CREAT RATIO UR  4 06*       Past Medical History:   Diagnosis Date    Anemia     Gestational hypertension     Hidradenitis     groin area    HPV (human papilloma virus) infection 2010    Hypothyroid     Papanicolaou smear 02/2018     Present on Admission:   Anemia, postpartum   Acquired hypothyroidism      Admitting Diagnosis: Hypertension [I10]  Age/Sex: 46 y o  female  Admission Orders:  Scheduled Medications:  docusate sodium, 100 mg, Oral, BID  ferrous sulfate, 325 mg, Oral, BID With Meals  labetalol, 200 mg, Oral, Q12H [address]  levothyroxine, 25 mcg, Oral, Early Morning  [START ON 9/8/2021] NIFEdipine, 90 mg, Oral, Daily    Continuous IV Infusions:   PRN Meds:  acetaminophen, 650 mg, Oral, Q6H PRN  calcium gluconate, 1 g, Intravenous, Once PRN  ibuprofen, 600 mg, Oral, Q6H PRN  ondansetron, 4 mg, Intravenous, Q8H PRN  simethicone, 80 mg, Oral, 4x Daily PRN  sod citrate-citric acid, 30 mL, Oral, 4x Daily PRN          Network Utilization Review Department  ATTENTION: Please call with any questions or concerns to [phone] and carefully listen to the prompts so that you are directed to the right person  All voicemails are confidential   Sendy Mackay all requests for admission clinical reviews, approved or denied determinations and any other requests to dedicated fax number below belonging to the campus where the patient is receiving treatment   List of dedicated fax numbers for the Facilities:  [address] DENIALS (Administrative/Medical Necessity) [phone]   [address] (Maternity/NICU/Pediatrics) [phone]   [address] [phone]   [address] Nichole Solorioel [place] [address] [address] [facility] [phone] [address] [place] [address] Jean-Paul Clancy 14[address] [place] [address] [phone]

## 2021-09-07 NOTE — CONSULTS
Consultation - Cardiology Team Irene Smith 46 y o  female MRN: [id number]  Unit/Bed#: -01 Encounter: [id number]    Inpatient consult to Cardiology  Consult performed by: ROSA Burk  Consult ordered by: Diana Srinivasan DO          Physician Requesting Consult: Dwight Maldonado MD  Reason for Consult / Principal Problem: preeclampsia    Assessment:    Pre-eclampsia  · Admitted by OB/GYN team 2021 for severe pre-eclampsia  · Patient is now 8 days post partum s/p LTCS and bilateral salpingectomy completed at [facility] on 2021  · She was discharged PPD #3 with mild pre-eclampsia at that time but was monitoring her BP at home  · Notes 's-160's and was re-admitted ; overall asymptomatic  · Upon admit she was given IV labetalol 20 mg x 1 dose and also given IV mag on admit as well  · Procardia 30 mg QD was started evening on admit and increased to 60 mg QD as of 2021  · Labetalol 200 mg BID started as of yesterday afternoon  · OB/GYN team contacted Dr Greg Serna yesterday and it was recommended to continue Procardia at 60 mg QD in the AM and labetalol 200 mg BID  · SBP has been averaging 150's and DBP 90's over the past 24 hrs  · Increase procardia to 90 mg QD and monitor BP; if minimal improvement can increase labetalol to 400 mg BID    S/p   · S/p LTCS and bilateral salpingectomy completed at [facility] on 2021  · She was discharged PPD #3     Postpartum anemia  · Hgb on admit 8  and repeat on  was 10 2    Plan/Recommendations:  · SBP has been averaging 150's and DBP 90's over the past 24 hrs  · Increase procardia to 90 mg QD and monitor BP; if minimal improvement can increase labetalol to 400 mg BID    _________________________________________________________________      History of Present Illness   HPI: John Godwin is a 46y o  year old female with PMH of hypothyroidism   She is now 8 days post partum s/p LTCS and bilateral salpingectomy completed at [address] [facility] on 8/30/2021  She was discharged PPD #3 with mild pre-eclampsia at that time but was monitoring her BP at home  Patient notes bilateral lower extremity edema at time of discharge up to her knees  She states that she felt well on discharge and during her time at home  She had been instructed to obtain a blood pressure cuff and monitor her blood pressure at home which she states had consistently been with a systolic blood pressure above 357 and diastolic blood pressure above 90 during her time at home  When she contacted her OB/gyn team regarding these blood pressure readings she was admitted for preeclampsia management  IV Mag was given and the patient was given Procardia on admission  Procardia has since been increased to 60 mg daily as of 09/05/2021 and she was also started on labetalol 200 mg b i d  as of yesterday afternoon  Cardiology is consulted for assistance with management of hypertension/preeclampsia  At time of interview and exam the patient is resting comfortably in bed with no acute cardiac complaints  She states she feels well with no complaints of headaches or visual disturbances  She states that her lower extremity edema has resolved since this admission  She states that she does not personally carry a past medical history of hypertension but does have hypertension in her family  EKG reviewed personally: no EKG performed on admit        Telemetry reviewed personally: not on tele        Review of Systems   Constitutional: Negative for chills, fever and malaise/fatigue  HENT: Negative for congestion  Cardiovascular: Negative for chest pain, dyspnea on exertion, leg swelling, orthopnea and palpitations  Respiratory: Negative for cough, shortness of breath (no SOB at rest) and wheezing  Endocrine: Negative  Hematologic/Lymphatic: Negative  Skin: Negative  Musculoskeletal: Negative      Gastrointestinal: Negative for bloating, abdominal pain, nausea and vomiting  Genitourinary: Negative  Neurological: Negative for dizziness and light-headedness  Psychiatric/Behavioral: Negative  All other systems reviewed and are negative      Historical Information   Past Medical History:   Diagnosis Date    Anemia     Gestational hypertension     Hidradenitis     groin area    HPV (human papilloma virus) infection     Hypothyroid     Papanicolaou smear 2018     Past Surgical History:   Procedure Laterality Date     SECTION  , ,     COLPOSCOPY      CYST REMOVAL  2014    groin     DILATION AND EVACUATION  2015, 2014    SALPINGECTOMY Bilateral 2021    during      Social History     Substance and Sexual Activity   Alcohol Use Not Currently    Comment: special occasions     Social History     Substance and Sexual Activity   Drug Use Never     Social History     Tobacco Use   Smoking Status Never Smoker   Smokeless Tobacco Never Used     Family History:   Family History   Problem Relation Age of Onset    Ovarian cancer Mother 52    Melanoma Mother 61    Hypertension Mother     Hyperlipidemia Mother     Rheum arthritis Mother 58    Hypertension Father     Hyperlipidemia Father     No Known Problems Brother     No Known Problems Daughter     No Known Problems Son     No Known Problems Brother     Seizures Brother     Hypertension Paternal Grandmother     Thyroid disease Paternal Grandmother     Diabetes Paternal Uncle        Meds/Allergies   current meds:   Current Facility-Administered Medications   Medication Dose Route Frequency    acetaminophen (TYLENOL) tablet 650 mg  650 mg Oral Q6H PRN    calcium gluconate 10 % injection 1 g  1 g Intravenous Once PRN    docusate sodium (COLACE) capsule 100 mg  100 mg Oral BID    ferrous sulfate tablet 325 mg  325 mg Oral BID With Meals    ibuprofen (MOTRIN) tablet 600 mg  600 mg Oral Q6H PRN    labetalol (NORMODYNE) tablet 200 mg  200 mg Oral Q12H [address]    levothyroxine tablet 25 mcg  25 mcg Oral Early Morning    NIFEdipine (PROCARDIA XL) 24 hr tablet 60 mg  60 mg Oral Daily    ondansetron (ZOFRAN) injection 4 mg  4 mg Intravenous Q8H PRN    simethicone (MYLICON) chewable tablet 80 mg  80 mg Oral 4x Daily PRN    sod citrate-citric acid (BICITRA) oral solution 30 mL  30 mL Oral 4x Daily PRN          Allergies   Allergen Reactions    Clindamycin Rash     Category: Allergy; [facility] - [id number]: hives         Objective   Vitals: Blood pressure 152/97, pulse 91, temperature 99 2 °F (37 3 °C), temperature source Oral, resp  rate 18, height 5' 5" (1 651 m), last menstrual period 12/04/2020, SpO2 98 %, currently breastfeeding ,     Body mass index is 34 78 kg/m²  ,     Systolic (88MKY), BYD:608 , Min:125 , CEC:091     Diastolic (03DKH), STB:46, Min:78, Max:106    Wt Readings from Last 3 Encounters:   08/24/21 94 8 kg (209 lb)   08/16/21 95 3 kg (210 lb 3 2 oz)   08/06/21 92 7 kg (204 lb 6 4 oz)      Lab Results   Component Value Date    CREATININE 0 80 09/06/2021    CREATININE 0 61 09/06/2021    CREATININE 0 75 09/05/2021           No intake or output data in the 24 hours ending 09/07/21 0816  Weight (last 2 days)     Date/Time    09/05/21 4459    Comment rows:    OBSERV: Dr Tg Gomez at bedside discussing plan of care at 09/05/21 8219            Invasive Devices     Peripheral Intravenous Line            Peripheral IV 09/04/21 Dorsal (posterior); Left Hand 2 days                  Physical Exam  Vitals reviewed  Constitutional:       General: She is not in acute distress  HENT:      Head: Normocephalic and atraumatic  Mouth/Throat:      Mouth: Mucous membranes are moist    Cardiovascular:      Rate and Rhythm: Normal rate and regular rhythm  Heart sounds: Normal heart sounds, S1 normal and S2 normal  No murmur heard  Pulmonary:      Effort: Pulmonary effort is normal  No respiratory distress  Breath sounds: Normal breath sounds     Abdominal: General: Bowel sounds are normal  There is no distension  Palpations: Abdomen is soft  Musculoskeletal:         General: Normal range of motion  Cervical back: Normal range of motion and neck supple  Right lower leg: No edema  Left lower leg: No edema  Skin:     General: Skin is warm and dry  Neurological:      Mental Status: She is alert and oriented to person, place, and time     Psychiatric:         Mood and Affect: Mood normal            LABORATORY RESULTS:      CBC with diff:   Results from last 7 days   Lab Units 09/05/21 0603 09/04/21  1432   WBC Thousand/uL 9 31 9 13   HEMOGLOBIN g/dL 10 2* 8 9*   HEMATOCRIT % 31 3* 28 3*   MCV fL 89 94   PLATELETS Thousands/uL 358 300   MCH pg 29 1 29 7   MCHC g/dL 32 6 31 4   RDW % 14 2 14 3   MPV fL 8 9 9 6       CMP:  Results from last 7 days   Lab Units 09/06/21  1302 09/06/21  0556 09/05/21 2108 09/05/21  1705 09/05/21  1253 09/05/21 0603 09/04/21  1432   POTASSIUM mmol/L 3 5 3 4* 3 3*  3 2* 3 2* 3 0* 2 5* 3 4*   CHLORIDE mmol/L 104 103 103  --   --  101 107   CO2 mmol/L 29 28 28  --   --  31 26   BUN mg/dL 10 7 8  --   --  5 9   CREATININE mg/dL 0 80 0 61 0 75  --   --  0 56* 0 65   CALCIUM mg/dL 8 7 7 8* 7 4*  --   --  7 2* 8 3   AST U/L  --   --   --   --   --  39 42   ALT U/L  --   --   --   --   --  47 46   ALK PHOS U/L  --   --   --   --   --  101 87   EGFR ml/min/1 73sq m 93 115 101  --   --  118 112       BMP:  Results from last 7 days   Lab Units 09/06/21  1302 09/06/21  0556 09/05/21 2108 09/05/21  1705 09/05/21  1253 09/05/21  0603 09/04/21  1432   POTASSIUM mmol/L 3 5 3 4* 3 3*  3 2* 3 2* 3 0* 2 5* 3 4*   CHLORIDE mmol/L 104 103 103  --   --  101 107   CO2 mmol/L 29 28 28  --   --  31 26   BUN mg/dL 10 7 8  --   --  5 9   CREATININE mg/dL 0 80 0 61 0 75  --   --  0 56* 0 65   CALCIUM mg/dL 8 7 7 8* 7 4*  --   --  7 2* 8 3          No results found for: NTBNP                               Lipid Profile:   No results found for: CHOL  No results found for: HDL  No results found for: LDLCALC  No results found for: TRIG      Cardiac testing:   No results found for this or any previous visit  No results found for this or any previous visit  No valid procedures specified  No results found for this or any previous visit  Imaging: I have personally reviewed pertinent reports  No results found  Counseling / Coordination of Care  Total floor / unit time spent today 45 minutes  Greater than 50% of total time was spent with the patient and / or family counseling and / or coordination of care  A description of the counseling / coordination of care: Review of history, current assessment, development of a plan  Code Status: Level 1 - Full Code    ** Please Note: Dragon 360 Dictation voice to text software may have been used in the creation of this document   **

## 2021-09-07 NOTE — DISCHARGE INSTRUCTIONS
You should take your blood pressure at home one time daily  Normal-range blood pressures in the postpartum period are less than 140 for the top number (systolic) AND less than 90 for the bottom number (diastolic)    Mild-range blood pressures in the postpartum period are 794-830 systolic OR  diastolic    Severe-range blood pressures in the postpartum period are 160 or greater systolic  or greater diastolic    If your blood pressure is greater than 160/110, you should promptly call [address] [place] [address]  at [phone] for further instruction  Symptoms of worsening pre-Eclampsia include headache that does not go away with Tylenol, changes in vision such as blurred vision or spots in your vision, chest pain, shortness of breath, pain in the upper right side of your abdomen, or sudden onset or worsening of swelling  If you develop any of the above symptoms, you should promptly call [address] [place] [address]  at [phone] and present to the hospital for evaluation of pre-eclampsia

## 2021-09-07 NOTE — DISCHARGE SUMMARY
Discharge Summary - OB/GYN   Lisa Mcdaniel 46 y o  female MRN: [id number]  Unit/Bed#: -01 Encounter: [id number]      Admission Date: 2021     Discharge Date: 2021    Admitting Diagnosis:   1  Postpartum pre-eclampsia with severe features    Discharge Diagnosis:   Same    Procedures: None    Delivery Attending: Iman Romero MD  Discharge Attending: Celia Ryan MD    Hospital Course:     Lisa Mcdaniel is a 46 y o  [id number] re-admitted on PPD/POD#5 for pre-eclampsia with severe features  Patient was initially admitted for delivery at [facility] in the setting of spontaneous rupture of membranes at 38w3d gestational age  She underwent repeat low-transverse  section with bilateral salpingectomy at [facility] on   During the course of that admission, she met criteria for pre-eclampsia without severe features  At the time of discharge to home on POD/PPD#3, blood pressures had been controlled without need for antihypertensive medications  On POD/PPD#5, the patient called the on call doctor due to elevated blood pressures in the [id number] systolic at home and was instructed to present to [facility] L&D for further evaluation  She subsequently met criteria for pre-eclampsia with severe features based on sustained severe-range blood pressures requiring administration of nifedipine IR 10mg x 1 and was therefore admitted for administration of IV magnesium sulfate x 24 hours and blood pressure stabilization  Standard pre-eclampsia laboratory panel was negative for HELLP syndrome, but notable for urine protein:creatine of 4 06  Transient hypokalemia was noted and treated with oral K-Dur  Hypokalemia resolved during the course of admission  Due to initial difficulty stabilizing blood pressures, Cardiology was consulted for further input  By the time of discharge, the patient's blood pressures had remained normal to mild-range > 24 hours   She was discharged to home on Procardia XL 90mg PO daily and labetalol 400mg PO BID with instructions for home BP monitoring and appropriate follow up with OB/GYN and Cardiology  Arrangements were made for completion of outpatient echocardiogram by Cardiology following discharge  She was discharged home on post-operative day #8  Complications: none apparent    Condition at discharge: good     Discharge instructions/Information to patient and family:   See after visit summary for information provided to patient and family  Provisions for Follow-Up Care:  See after visit summary for information related to follow-up care and any pertinent home health orders  Disposition: Home    Planned Readmission: No    Discharge Medications: For a complete list of the patient's medications, please refer to her med rec      Franck Walton MD  9/7/2021 3:04 PM

## 2021-09-13 ENCOUNTER — TELEPHONE (OUTPATIENT)
Dept: CARDIOLOGY CLINIC | Facility: CLINIC | Age: 41
End: 2021-09-13

## 2021-09-13 ENCOUNTER — PATIENT MESSAGE (OUTPATIENT)
Dept: OBGYN CLINIC | Facility: CLINIC | Age: 41
End: 2021-09-13

## 2021-09-13 NOTE — TELEPHONE ENCOUNTER
Cheryl Harris RN 9/13/2021 1:31 PM EDT      ----- Message -----  From: Mary Butts  Sent: 9/13/2021 1:14 PM EDT  To: [facility]  Subject: RE: checking in     Hi Dr Chele Edwards,     Thank you for checking in  I am feeling pretty good, but I'm starting to wonder if I may be on too high of a dose of the blood pressure medications  For the past day and a half I have had some low readings - in the 90s over high 50s or low 60s - and when I do, I often feel a little light-headed, especially when doing anything physically active  The low pressures typically occur around 1-2 hours after I take my morning meds and then it does seem to level off where I get pressures around 118/70  In the evening when I take the second dose of the labatelol, I don't notice the low pressures, but I'm also not moving around as much since it's typically around bedtime for my kids  I have called the cardiology office three times now, but each time I do, I get the voicemail for the   Unfortunately, each time she has called back, I haven't been able to answer it  I am hoping to get a hold of someone today and ask about the meds as well  In the meantime, is there anything you can recommend that I do? Thank you again for all of your help and for taking the time to reach out to me today  Seema     ----- Message -----  From: Ross Palma MD  Sent: 9/13/21, 10:05 AM  To: Mary Butts  Subject: checking in    [address],  Wanted to check in and see how you and everyone is doing  I left a msg on your voicemail  No need to call me back  Just wanted to make sure you're following up with cardiology and feeling well  I know it's scary to have to go home on blood pressure medication after delivery, but sometimes this does happen  I'm glad we have cardiology involved to help manage the medications   Rarely do patients need to be on medications long term for blood pressures due to preeclampsia, but it can take a couple months to normalize    Please contact me if you have questions or need anything,  Dr Raj Escobar

## 2021-09-13 NOTE — TELEPHONE ENCOUNTER
If she checking her blood pressure home? If she is it is running low she can go back down on the labetalol to her prior dose  Thank you

## 2021-09-13 NOTE — TELEPHONE ENCOUNTER
It appears that the pt, has just begun taking Labetalol 400mg BID as well as Procardia 90mg on 9/4/21  Please advise what you would like it to be lowered to  TY

## 2021-09-13 NOTE — TELEPHONE ENCOUNTER
Pt called stating that she is having dizziness with physical activity, and almost passed out when standing this morning  Pt was seen by you in hospital on 9/4/21 and you prescribed Labetalol 400mg BID and Procardia 90 mg once daily  Pt looking for a possible decrease in meds to help with dizziness       Please advise thank you

## 2021-09-13 NOTE — TELEPHONE ENCOUNTER
Yes that was started at her hospitalization by AMANDA  The only recommendation we made when we finally saw her was to increase the labetalol I believe from 200-400 mg b I d  So therefore I would just have her go back down on the labetalol and have her follow her blood pressure  Thank you

## 2021-09-13 NOTE — TELEPHONE ENCOUNTER
I returned Seema's call  She has not heard from cardiology yet  She reiterated what she said in her note about the low Bps  Currently she is taking Procardia XL 90mg and Labetalol 400mg in AM and then Labetalol 400mg in PM     I am concerned about the low BP a few hours after the morning Labetalol and that she is feeling symptomatic  Although it seems transient, she mentioned her  is back at work now and she is taking care of her 3 children  She states throughout the day when she does check blood pressure it is usually -120s  I suggested tomorrow, if she hasn't heard from cardiology, to take the morning Procardia dose and not the Labetalol  She should keep an eye on her blood pressure, every couple hours throughout the day  If she notices the blood pressure rising and > 140/90, she can then take the Labetalol then  She agrees

## 2021-09-14 ENCOUNTER — TELEPHONE (OUTPATIENT)
Dept: CARDIOLOGY CLINIC | Facility: CLINIC | Age: 41
End: 2021-09-14

## 2021-09-17 ENCOUNTER — TELEPHONE (OUTPATIENT)
Dept: CARDIOLOGY CLINIC | Facility: CLINIC | Age: 41
End: 2021-09-17

## 2021-09-23 NOTE — UTILIZATION REVIEW
Inpatient Admission Authorization Request   NOTIFICATION OF INPATIENT ADMISSION/INPATIENT AUTHORIZATION REQUEST     SERVICING FACILITY:   [place] [address]  Tax ID: [id number]  NPI: [id number]  Place of Service: Inpatient [facility] [facility]  60  Place of Service Code: 24      ATTENDING PROVIDER:  Attending Name and NPI#: Francisca Sims, Hari Garner [[id number]]  Address: [address] [place] [address]  Phone: [phone]      UTILIZATION REVIEW CONTACT:  [facility] Utilization   Network Utilization Review Department  Phone: [phone]  Fax [phone]  Email: [email or web address]      PHYSICIAN ADVISORY SERVICES:  FOR AIVT-HF-ZWKP REVIEW - MEDICAL NECESSITY DENIAL  Phone: [phone]  Fax: [phone]  Email: [email or web address]      TYPE OF REQUEST:    Inpatient Status      ADMISSION INFORMATION:    ADMISSION DATE/TIME: 9/4/21  3:37 PM    PATIENT DIAGNOSIS CODE/DESCRIPTION:  Hypertension [I10]    DISCHARGE DATE/TIME: No discharge date for patient encounter      DISCHARGE DISPOSITION (IF DISCHARGED): Final discharge disposition not confirmed        IMPORTANT INFORMATION:  Please contact the [facility] directly with any questions or concerns regarding this request  Department voicemails are confidential      Send requests for admission clinical reviews, concurrent reviews, approvals, and administrative denials due to lack of clinical to fax [phone]         Initial Clinical Review     OBS 9/4 @ 1826 UPGRADED TO INPATIENT 9/5 @ 3675 [facility]     09/05/21 1055  Inpatient Admission Once     Transfer Service: OB/GYN       Question Answer Comment   Level of Care Med Surg         09/05/21 1055      Initial Presentation: 46 y o  yo [id number] who is 5 days post partum and post op from repeat LTCS and b/l salpingectomy on 8/30/2021 at [address] [place] [address] admission delivery was complicated by mild preeclampsia diagnosed at delivery and postpartum hemorrhage with anemia   She did well postop and her BP's where only elevated in the mild range 130-140s/70-90s upon discharge   Her postop hgb was 8 2g/dl and she was asymptomatic  On d/c she was instructed to monitor her BP's and report if >160/110 or developed symptoms of severe preeclampsia  She called her doctor today reporting her BP at home was Systolic 762-357'X, and mild headache this AM  Also reports LE edema which has worsened since d/c  Admit observation to L&D with pre-eclampsia with severe features   Plan:      1) Hypertension in pregnancy, preeclampsia, severe, delivered/postpartum  Patient with mild preeclampsia at delivery and d/c home POD#3 with mildly elevated BP   She returned today asx but BP elevated 160s/90s at home  First BP 180/96,  Then 185/94  Sunita Estrella received Procardia 10mg after 2nd BP, next /78, then 159/84   Magnesium sulfate was started for seizure prophylaxis  Will continue to monitor and start Procardia XL if persist >150/90      Discussed w/ pt and  dx of severe preelcampsia based on blood pressures and risk of progression to eclampsia   Recommend 24 hours of IV magnesium sulfate for seizure prophylaxis   Discussed risks, benefits, alternatives     VS per protocol, strict I/O (bathroom privileges okay as long as not neuro symptoms), clear fluid diet, may progress to house diet if pt is hungry and no neuro symptoms)  Repeat labs in morning     2) Anemia, postpartum  Current hgb 8 9g/dl  Rozanna Pay continue PO iron as long as pt is tolerating     3) Acquired hypothyroidism  Continue 25mcg daily     4) s/p   Motrin and Tylenol prn pain     Date:   -- upgraded to inpatient status --  Magnesium Sulfate started yesterday ~17:00 for seizure ppx after received Procardia 10mg PO for severe BP   BP improved   Long acting Procardia XL 30mg PO was given at 18:30, due to SBP mostly 150s  At 22:15 pt with severe range BP and received Labetalol 20mg IV with improvement  This morning she received Procardia XL 30mg at 8:00  BP continues to be elevated in mild range, many with    Will continue to watch closely and if BP do not improve to  or below, will discuss w/ MFM and consider increase Procardia to 60mg  Pt diuresing well, swelling improved  Mild HA this morning, improved from overnight when received Fioricet   Pt feeling a little light headed as well   Hgb 10, no evidence blood loss   Likely side effect from magnesium   DTR normal w/o signs of magnesium toxicity  Mag to continue until 17:00 tonight  Continue to monitor pt overnight for BP surveillance after Magnesium discontinued  K 2 4 today, feeling a little lightheaded d/t mag but suresh po, only clears  Give 40 mEq KDur and repeat level in 4 hrs  Reg diet if suresh   Hgb 10 2, continue po iron              ED Triage Vitals   Temperature Pulse Respirations Blood Pressure SpO2   09/04/21 1425 09/04/21 1425 09/04/21 1425 09/04/21 1425 09/04/21 1610   (!) 97 °F (36 1 °C) 86 18 (!) 180/96 99 %       Temp Source Heart Rate Source Patient Position - Orthostatic VS BP Location FiO2 (%)   09/04/21 1425 09/04/21 1425 09/04/21 1532 09/04/21 1532 --   Temporal Monitor Sitting Right arm         Pain Score           09/04/21 1425           No Pain                  Wt Readings from Last 1 Encounters:   08/24/21 94 8 kg (209 lb)      Additional Vital Signs:   Date/Time   Temp   Pulse   Resp   BP   MAP (mmHg)   SpO2   O2 Device   Cardiac (WDL)   Patient Position - Orthostatic VS   09/05/21 0800   99 5 °F (37 5 °C)   83   18   152/91   --   96 %   None (Room air)   WDL   --   09/05/21 0600   98 °F (36 7 °C)   79   18   157/93   108   96 %   None (Room air)   --   Sitting   09/05/21 0500   --   78   18   137/82   93   97 %   None (Room air)   --   Sitting   09/05/21 0400   98 °F (36 7 °C)   78   18   143/78   89   97 %   None (Room air)   --   Sitting 09/05/21 0300   --   84   18   145/88   102   97 %   None (Room air)   --   Sitting   09/05/21 0200   97 8 °F (36 6 °C)   80   18   143/77   94   97 %   None (Room air)   --   Sitting   09/05/21 0125   --   79   18   146/87   98   100 %   None (Room air)   --   Sitting   09/05/21 0055   --   80   18   153/89   102   98 %   None (Room air)   --   Sitting   09/05/21 0025   --   78   18   147/88   102   97 %   None (Room air)   --   Sitting   09/05/21 0010   --   76   18   145/84   97   97 %   None (Room air)   --   Sitting   09/04/21 2355   98 °F (36 7 °C)   76   18   136/75   91   96 %   None (Room air)   --   Sitting   09/04/21 2340   --   77   18   136/85   98   96 %   None (Room air)   --   Sitting   09/04/21 2326   --   76   18   139/82   99   98 %   None (Room air)   --   Sitting   09/04/21 2316   --   77   18   142/84   100   96 %   None (Room air)   --   Sitting   09/04/21 2306   --   81   18   149/89   98   98 %   None (Room air)   --   Sitting   09/04/21 2300   98 °F (36 7 °C)   --   --   --   --   94 %   None (Room air)   --   --   09/04/21 2256   --   76   18   144/91   --   94 %   None (Room air)   --   Sitting   09/04/21 2246   --   75   18   143/91   --   96 %   None (Room air)   --   Sitting   09/04/21 2236   --   75   18   141/98   --   96 %   None (Room air)   --   Sitting   09/04/21 2200   98 2 °F (36 8 °C)   82   18   161/95   116   98 %   None (Room air)   --   Sitting   09/04/21 2000   98 7 °F (37 1 °C)   85   18   --   --   98 %   None (Room air)   WDL   --   09/04/21 1932   --   --   --   142/85   103   --   --   --   Sitting   09/04/21 1815   --   80   --   162/90   --   --   --   --   --   09/04/21 1800   98 °F (36 7 °C)   88   18   161/95    --   99 %   --   --   Sitting   BP: will recheck in 15 minutes at 09/04/21 1800   09/04/21 1636   --   93   18   152/87   --   --   --   --   --   09/04/21 1610   98 6 °F (37 °C)   83   18   159/86   --   99 %   --   --   Sitting   09/04/21 1600   98 6 °F (37 °C)   --   18   --   --   --   None (Room air)   --   --   09/04/21 1556   --   --   --   --   --   --   None (Room air)   WDL   --   09/04/21 1532   98 6 °F (37 °C)   84   18   163/92   --   --   --   --   Sitting   09/04/21 1515   --   99   --   159/84   --   --   --   --   --   09/04/21 1457   --   96   --   161/78   --   --   --   --   --   09/04/21 1442   --   81   --   185/94Abnormal    --   --   --   --   --   09/04/21 1425   97 °F (36 1 °C)Abnormal    86   18   180/96Abnormal     --   --   --   --   --            Pertinent Labs/Diagnostic Test Results:     Results from last 7 days   Lab Units 09/05/21 0603 09/04/21  1432   WBC Thousand/uL 9 31 9 13   HEMOGLOBIN g/dL 10 2* 8 9*   HEMATOCRIT % 31 3* 28 3*   PLATELETS Thousands/uL 358 300            Results from last 7 days   Lab Units 09/05/21 0603 09/04/21  1432   SODIUM mmol/L 141 145   POTASSIUM mmol/L 2 5* 3 4*   CHLORIDE mmol/L 101 107   CO2 mmol/L 31 26   ANION GAP mmol/L 9 12   BUN mg/dL 5 9   CREATININE mg/dL 0 56* 0 65   EGFR ml/min/1 73sq m 118 112   CALCIUM mg/dL 7 2* 8 3            Results from last 7 days   Lab Units 09/05/21 0603 09/04/21  1432   AST U/L 39 42   ALT U/L 47 46   ALK PHOS U/L 101 87   TOTAL PROTEIN g/dL 7 1 6 3*   ALBUMIN g/dL 3 0* 2 6*   TOTAL BILIRUBIN mg/dL 0 60 0 40           Results from last 7 days   Lab Units 09/05/21  0018   POC GLUCOSE mg/dl 116            Results from last 7 days   Lab Units 09/05/21  0603 09/04/21  1432   GLUCOSE RANDOM mg/dL 92 77            Results from last 7 days   Lab Units 09/04/21  1432   CREATININE UR mg/dL 14 3   PROTEIN UR mg/dL 58   PROT/CREAT RATIO UR   4 06*         Medical History   Past Medical History:   Diagnosis Date    Anemia      Gestational hypertension      Hidradenitis       groin area    HPV (human papilloma virus) infection 2010    Hypothyroid      Papanicolaou smear 02/2018         Present on Admission:   Anemia, postpartum        Admitting Diagnosis: Hypertension [I10]  Age/Sex: 46 y o  female  Admission Orders:  Scheduled Medications:  docusate sodium, 100 mg, Oral, BID  ferrous sulfate, 325 mg, Oral, BID With Meals  levothyroxine, 25 mcg, Oral, Early Morning  NIFEdipine, 30 mg, Oral, Daily     Continuous IV Infusions:  lactated ringers, 75 mL/hr, Intravenous, Continuous  magnesium sulfate, 2 g/hr, Intravenous, Continuous     PRN Meds:  acetaminophen, 650 mg, Oral, Q6H PRN  calcium gluconate, 1 g, Intravenous, Once PRN  ibuprofen, 600 mg, Oral, Q6H PRN  ondansetron, 4 mg, Intravenous, Q8H PRN  simethicone, 80 mg, Oral, 4x Daily PRN  sod citrate-citric acid, 30 mL, Oral, 4x Daily PRN              Network Utilization Review Department  ATTENTION: Please call with any questions or concerns to [phone] and carefully listen to the prompts so that you are directed to the right person  All voicemails are confidential   Kurt Rao all requests for admission clinical reviews, approved or denied determinations and any other requests to dedicated fax number below belonging to the campus where the patient is receiving treatment   List of dedicated fax numbers for the Facilities:  [address] DENIALS (Administrative/Medical Necessity) [phone]   [address] (Maternity/NICU/Pediatrics) [phone]   [address] Nichole Shrestha 1091 [address] [place] [address] Jean-Paul Clancy 14[address] [facility] [phone]

## 2021-09-29 RX ORDER — NIFEDIPINE 30 MG/1
90 TABLET, FILM COATED, EXTENDED RELEASE ORAL DAILY
Qty: 90 TABLET | Refills: 0 | OUTPATIENT
Start: 2021-09-29 | End: 2021-10-29

## 2021-09-29 RX ORDER — LABETALOL 200 MG/1
400 TABLET, FILM COATED ORAL EVERY 12 HOURS SCHEDULED
Qty: 120 TABLET | Refills: 1 | OUTPATIENT
Start: 2021-09-29 | End: 2021-10-29

## 2021-09-29 RX ORDER — NIFEDIPINE 30 MG/1
90 TABLET, EXTENDED RELEASE ORAL DAILY
Qty: 90 TABLET | Refills: 0 | Status: SHIPPED | OUTPATIENT
Start: 2021-09-29 | End: 2021-10-12 | Stop reason: ALTCHOICE

## 2021-09-29 NOTE — TELEPHONE ENCOUNTER
Patient returned call  Reviewed current medications as well as symptoms  Patient has discontinued labetalol due to relative hypotension and dizziness  Currently taking only Procardia XL 90mg PO once daily  However, still having episodes of dizziness with blood pressures at times ranging in the 547-497 systolic  Will refill Procardia XL only at this time, but switch to 30mg tablets and have patient decrease to 60mg daily if she remains symptomatic  Has follow up with Cardiology in the next 2 weeks  Patient will discuss regimen further at that time  Patient agreeable to plan       Sukhdeep Hale MD  9/29/2021 7:16 PM

## 2021-09-29 NOTE — TELEPHONE ENCOUNTER
Received refill request for both nifedipine and labetalol  Called patient to review current regimen as well as blood pressures at home, as Dr Jack Chaves had previously discussed decreasing labetalol dosing based on relative hypotension at home  Patient did not answer  Left voicemail requesting that patient call the office to discuss further  Will provide refills pending further discussion  She has follow up scheduled with Cardiology as well as with Dr Jack Chaves for routine postpartum care       Luis Godwin MD  9/29/2021 4:10 PM

## 2021-10-05 ENCOUNTER — HOSPITAL ENCOUNTER (OUTPATIENT)
Dept: NON INVASIVE DIAGNOSTICS | Facility: CLINIC | Age: 41
Discharge: HOME | End: 2021-10-05
Payer: COMMERCIAL

## 2021-10-05 PROCEDURE — 93306 TTE W/DOPPLER COMPLETE: CPT

## 2021-10-06 PROCEDURE — 93306 TTE W/DOPPLER COMPLETE: CPT | Performed by: INTERNAL MEDICINE

## 2021-10-12 ENCOUNTER — OFFICE VISIT (OUTPATIENT)
Dept: CARDIOLOGY CLINIC | Facility: CLINIC | Age: 41
End: 2021-10-12
Payer: COMMERCIAL

## 2021-10-12 VITALS
DIASTOLIC BLOOD PRESSURE: 82 MMHG | SYSTOLIC BLOOD PRESSURE: 124 MMHG | HEART RATE: 58 BPM | BODY MASS INDEX: 30.29 KG/M2 | HEIGHT: 65 IN | WEIGHT: 181.8 LBS

## 2021-10-12 DIAGNOSIS — Z09 HOSPITAL DISCHARGE FOLLOW-UP: Primary | ICD-10-CM

## 2021-10-12 PROCEDURE — 99214 OFFICE O/P EST MOD 30 MIN: CPT | Performed by: NURSE PRACTITIONER

## 2021-10-12 PROCEDURE — 93000 ELECTROCARDIOGRAM COMPLETE: CPT | Performed by: NURSE PRACTITIONER

## 2021-10-12 RX ORDER — NIFEDIPINE 90 MG/1
90 TABLET, EXTENDED RELEASE ORAL DAILY
Qty: 90 TABLET | Refills: 1 | Status: SHIPPED | OUTPATIENT
Start: 2021-10-12 | End: 2022-04-10

## 2021-10-18 ENCOUNTER — POSTPARTUM VISIT (OUTPATIENT)
Dept: OBGYN CLINIC | Facility: CLINIC | Age: 41
End: 2021-10-18

## 2021-10-18 VITALS
HEIGHT: 65 IN | SYSTOLIC BLOOD PRESSURE: 102 MMHG | WEIGHT: 176.8 LBS | DIASTOLIC BLOOD PRESSURE: 64 MMHG | BODY MASS INDEX: 29.46 KG/M2

## 2021-10-18 PROBLEM — O34.211 MATERNAL CARE DUE TO LOW TRANSVERSE UTERINE SCAR FROM PREVIOUS CESAREAN DELIVERY: Status: RESOLVED | Noted: 2021-06-17 | Resolved: 2021-10-18

## 2021-10-18 PROBLEM — Z30.2 REQUEST FOR STERILIZATION: Status: RESOLVED | Noted: 2021-07-14 | Resolved: 2021-10-18

## 2021-10-18 PROBLEM — O99.213 OBESITY AFFECTING PREGNANCY IN THIRD TRIMESTER: Status: RESOLVED | Noted: 2021-07-02 | Resolved: 2021-10-18

## 2021-10-18 PROBLEM — O09.523 AMA (ADVANCED MATERNAL AGE) MULTIGRAVIDA 35+, THIRD TRIMESTER: Status: RESOLVED | Noted: 2021-06-17 | Resolved: 2021-10-18

## 2021-10-18 PROCEDURE — 99024 POSTOP FOLLOW-UP VISIT: CPT | Performed by: OBSTETRICS & GYNECOLOGY

## 2021-10-20 ENCOUNTER — TELEPHONE (OUTPATIENT)
Dept: OBGYN CLINIC | Facility: CLINIC | Age: 41
End: 2021-10-20

## 2022-08-15 PROBLEM — E03.9 HYPOTHYROIDISM: Status: ACTIVE | Noted: 2022-08-15

## 2022-08-15 PROBLEM — Z01.419 ROUTINE GYNECOLOGICAL EXAMINATION: Status: ACTIVE | Noted: 2022-08-15

## 2022-08-15 NOTE — PATIENT INSTRUCTIONS
Calcium 1200-1500mg + 600-1000 IU Vit D daily  Exercise 150 minutes per week minimum including weight bearing exercises  Pap with high risk HPV Q 3-5 years  Annual mammogram and monthly breast self exam recommended  Colonoscopy-  encouraged  at 41      Kegels 20 times twice daily  Silicone based lubricant with sex  Vaginal moisturizers twice weekly as needed

## 2022-08-15 NOTE — PROGRESS NOTES
[address], [address], [place], [address]    ASSESSMENT/PLAN: Evette Litten is a 38 y o  [id number] who presents for annual gynecologic exam     Encounter for routine gynecologic examination  - Routine well woman exam completed today  - Cervical Cancer Screening: Current ASCCP Guidelines reviewed  Last Pap: 2018  Next Pap Due: today   - COVID vaccine   Pfizer    X 2  maderna    - Contraceptive counseling discussed  Current contraception:  btl    - Breast Cancer Screening: Last Mammogram Not on file,    Additional problems addressed during this visit:  1  Routine gynecological examination    2  Encounter for screening mammogram for malignant neoplasm of breast  -     Mammo screening bilateral w 3d & cad; Future    3  Hypertension in pregnancy, preeclampsia, severe, delivered/postpartum    4  Hypothyroidism, unspecified type  Comments:  One more visit  may take off  meds  no S+S     5  Menorrhagia with regular cycle  Comments:  weaning baby hx of   HMB  always  did well  n  seasonique    + helped  HS  Orders:  -     Levonorgest-Eth Estrad 91-Day 0 15-0 03 &0 01 MG TABS; Take 0 15 mg by mouth daily        CC:  Annual Gynecologic Examination    HPI: Evette Litten is a 38 y o  [id number] who presents for annual gynecologic examination  37 yo here for wellness exam    + hx of  HMB   Was on  CHARLES      + hx of HS  Cycles  Every  23-30  D  For  5-6 d   Wearing  Pads   Chg  Every   1-2 hours  w clots  BTL  The following portions of the patient's history were reviewed and updated as appropriate: She  has a past medical history of Anemia, Gestational hypertension, Hidradenitis, HPV (human papilloma virus) infection (), Hypothyroid, and Papanicolaou smear (2018)  She  has a past surgical history that includes Dilation and evacuation (2015, 2014); Cyst Removal (2014);  section (, , );  Colposcopy; and Salpingectomy (Bilateral, 08/2021)  Her family history includes Diabetes in her paternal uncle; Hyperlipidemia in her father and mother; Hypertension in her father, mother, and paternal grandmother; Melanoma (age of onset: 61) in her mother; No Known Problems in her brother, brother, daughter, and son; Ovarian cancer (age of onset: 52) in her mother; Rheum arthritis (age of onset: 58) in her mother; Seizures in her brother; Thyroid disease in her paternal grandmother  She  reports that she has never smoked  She has never used smokeless tobacco  She reports previous alcohol use  She reports that she does not use drugs  Current Outpatient Medications   Medication Sig Dispense Refill    Levonorgest-Eth Estrad 91-Day 0 15-0 03 &0 01 MG TABS Take 0 15 mg by mouth daily 91 tablet 3    levothyroxine 25 mcg tablet TAKE 1 TABLET BY MOUTH ON AN EMPTY STOMACH IN THE MORNING      Prenatal Vit-Fe Fumarate-FA (prenatal multivitamin) 28-0 8 MG TABS Take 1 tablet by mouth daily      Cholecalciferol (Vitamin D) 50 MCG (2000 UT) CAPS Take 2,000 Units by mouth daily      ferrous sulfate 325 (65 Fe) mg tablet Take 325 mg by mouth 2 (two) times a day with meals       NIFEdipine (PROCARDIA XL) 90 mg 24 hr tablet Take 1 tablet (90 mg total) by mouth daily 90 tablet 1     No current facility-administered medications for this visit  She is allergic to clindamycin       Review of Systems   Constitutional: Negative for chills and fever  HENT: Negative for ear pain and sore throat  Eyes: Negative for pain and visual disturbance  Respiratory: Negative for cough and shortness of breath  Cardiovascular: Negative for chest pain and palpitations  Gastrointestinal: Negative for abdominal pain and vomiting  Genitourinary: Negative for dysuria and hematuria  Musculoskeletal: Negative for arthralgias and back pain  Skin: Negative for color change and rash  Neurological: Negative for seizures and syncope  Psychiatric/Behavioral: Negative      All other systems reviewed and are negative  Objective:  /80 (BP Location: Left arm, Patient Position: Sitting, Cuff Size: Standard)   Ht 5' 4 5" (1 638 m)   Wt 80 7 kg (178 lb)   LMP 08/14/2022   Breastfeeding Yes   BMI 30 08 kg/m²    Physical Exam  Vitals and nursing note reviewed  Constitutional:       Appearance: Normal appearance  HENT:      Head: Normocephalic  Cardiovascular:      Rate and Rhythm: Normal rate and regular rhythm  Pulses: Normal pulses  Heart sounds: Normal heart sounds  Pulmonary:      Effort: Pulmonary effort is normal       Breath sounds: Normal breath sounds  Chest:      Chest wall: No mass, lacerations, swelling, tenderness or edema  Breasts: Brando Score is 4  Breasts are symmetrical       Right: Normal  No swelling, bleeding, inverted nipple, mass, nipple discharge, skin change, tenderness, axillary adenopathy or supraclavicular adenopathy  Left: No swelling, bleeding, inverted nipple, mass, nipple discharge, skin change, tenderness, axillary adenopathy or supraclavicular adenopathy  Abdominal:      General: Abdomen is flat  Bowel sounds are normal       Palpations: Abdomen is soft  Genitourinary:     General: Normal vulva  Exam position: Lithotomy position  Pubic Area: No rash  Brando stage (genital): 4       Labia:         Right: No rash, tenderness or lesion  Left: No rash, tenderness or lesion  Urethra: No urethral pain, urethral swelling or urethral lesion  Vagina: Bleeding present  Cervix: Cervical bleeding present  No cervical motion tenderness or discharge  Uterus: Normal        Adnexa: Right adnexa normal and left adnexa normal       Rectum: Normal       Comments: Heavy menses noted  w  Clotting   Musculoskeletal:         General: Normal range of motion  Cervical back: Neck supple     Lymphadenopathy:      Upper Body:      Right upper body: No supraclavicular, axillary or pectoral adenopathy  Left upper body: No supraclavicular, axillary or pectoral adenopathy  Lower Body: No right inguinal adenopathy  No left inguinal adenopathy  Skin:     General: Skin is warm and dry  Neurological:      General: No focal deficit present  Mental Status: She is alert and oriented to person, place, and time     Psychiatric:         Mood and Affect: Mood normal          Behavior: Behavior normal

## 2022-08-16 ENCOUNTER — OFFICE VISIT (OUTPATIENT)
Dept: OBGYN CLINIC | Facility: CLINIC | Age: 42
End: 2022-08-16

## 2022-08-16 VITALS
WEIGHT: 178 LBS | SYSTOLIC BLOOD PRESSURE: 114 MMHG | DIASTOLIC BLOOD PRESSURE: 80 MMHG | BODY MASS INDEX: 29.66 KG/M2 | HEIGHT: 65 IN

## 2022-08-16 DIAGNOSIS — Z12.31 ENCOUNTER FOR SCREENING MAMMOGRAM FOR MALIGNANT NEOPLASM OF BREAST: ICD-10-CM

## 2022-08-16 DIAGNOSIS — N92.0 MENORRHAGIA WITH REGULAR CYCLE: ICD-10-CM

## 2022-08-16 DIAGNOSIS — Z01.419 ROUTINE GYNECOLOGICAL EXAMINATION: Primary | ICD-10-CM

## 2022-08-16 DIAGNOSIS — Z12.4 SCREENING FOR CERVICAL CANCER: ICD-10-CM

## 2022-08-16 DIAGNOSIS — E03.9 HYPOTHYROIDISM, UNSPECIFIED TYPE: ICD-10-CM

## 2022-08-16 RX ORDER — LEVONORGESTREL / ETHINYL ESTRADIOL AND ETHINYL ESTRADIOL 150-30(84)
0.15 KIT ORAL DAILY
Qty: 91 TABLET | Refills: 3 | Status: SHIPPED | OUTPATIENT
Start: 2022-08-16 | End: 2023-08-16

## 2022-08-23 LAB
CYTOLOGIST CVX/VAG CYTO: NORMAL
DX ICD CODE: NORMAL
HPV I/H RISK 4 DNA CVX QL PROBE+SIG AMP: NEGATIVE
OTHER STN SPEC: NORMAL
PATH REPORT.FINAL DX SPEC: NORMAL
SL AMB NOTE:: NORMAL
SL AMB SPECIMEN ADEQUACY: NORMAL
SL AMB TEST METHODOLOGY: NORMAL

## 2022-10-14 PROBLEM — Z01.419 ROUTINE GYNECOLOGICAL EXAMINATION: Status: RESOLVED | Noted: 2022-08-15 | Resolved: 2022-10-14

## 2022-10-15 PROBLEM — Z12.4 SCREENING FOR CERVICAL CANCER: Status: RESOLVED | Noted: 2022-08-16 | Resolved: 2022-10-15

## 2025-02-04 ENCOUNTER — OFFICE VISIT (OUTPATIENT)
Dept: OBGYN CLINIC | Facility: CLINIC | Age: 45
End: 2025-02-04
Payer: COMMERCIAL

## 2025-02-04 VITALS
WEIGHT: 198 LBS | HEIGHT: 65 IN | DIASTOLIC BLOOD PRESSURE: 80 MMHG | SYSTOLIC BLOOD PRESSURE: 120 MMHG | BODY MASS INDEX: 32.99 KG/M2

## 2025-02-04 DIAGNOSIS — N92.0 MENORRHAGIA WITH REGULAR CYCLE: ICD-10-CM

## 2025-02-04 DIAGNOSIS — Z01.419 ENCOUNTER FOR GYNECOLOGICAL EXAMINATION WITHOUT ABNORMAL FINDING: Primary | ICD-10-CM

## 2025-02-04 DIAGNOSIS — Z12.31 ENCOUNTER FOR SCREENING MAMMOGRAM FOR MALIGNANT NEOPLASM OF BREAST: ICD-10-CM

## 2025-02-04 DIAGNOSIS — Z12.4 SCREENING FOR CERVICAL CANCER: ICD-10-CM

## 2025-02-04 PROCEDURE — S0612 ANNUAL GYNECOLOGICAL EXAMINA: HCPCS | Performed by: OBSTETRICS & GYNECOLOGY

## 2025-02-04 RX ORDER — DROSPIRENONE AND ETHINYL ESTRADIOL 0.02-3(28)
1 KIT ORAL DAILY
Qty: 90 TABLET | Refills: 4 | Status: SHIPPED | OUTPATIENT
Start: 2025-02-04

## 2025-02-04 NOTE — PROGRESS NOTES
[facility] - [address], [address], [place] [address]    ASSESSMENT/PLAN: Seema Smith is a 44 y.o.  who presents for annual gynecologic exam.    Encounter for routine gynecologic examination  - Routine well woman exam completed today.  - Cervical Cancer Screening: Current ASCCP Guidelines reviewed. Last Pap: 2022  Next Pap Due: today   - STI screening offered including HIV testing: Declined  - Contraceptive counseling discussed.  Current contraception:   tubal    - Breast Cancer Screening: Last Mammogram Not on file,  due now     Additional problems addressed during this visit:  1. Encounter for gynecological examination without abnormal finding  2. Encounter for screening mammogram for malignant neoplasm of breast  -     Mammo screening bilateral w 3d and cad; Future  3. Screening for cervical cancer  -     IGP, Aptima HPV, Rfx 16/18,45  4. Menorrhagia with regular cycle  Comments:  Motirn 600 mg every 6 hours while awake  Orders:  -     drospirenone-ethinyl estradiol (KOKO) 3-0.02 MG per tablet; Take 1 tablet by mouth daily      CC:  Annual Gynecologic Examination    HPI: Seema Smith is a 44 y.o.  who presents for annual gynecologic examination.  13 yo here for wellness exam .  + hx of   HS  and  regulation needed  of period. Flares w  period,  Started    continuous  Ocp  but had a year of bleeding.   Had been better when on pill.         The following portions of the patient's history were reviewed and updated as appropriate: She  has a past medical history of Anemia, Gestational hypertension, Hidradenitis, HPV (human papilloma virus) infection (), Hypothyroid, Miscarriage (), and Papanicolaou smear (2018).  She  has a past surgical history that includes Dilation and evacuation (2015, 2014); Cyst Removal (2014);  section (, , ); Colposcopy; and Salpingectomy (Bilateral, 2021).  Her family history includes Cancer in her  "mother; Diabetes in her paternal uncle; Heart attack in her maternal grandfather and maternal grandmother; Hyperlipidemia in her father and mother; Hypertension in her father, mother, and paternal grandmother; Melanoma (age of onset: 60) in her mother; No Known Problems in her brother, brother, daughter, and son; Ovarian cancer (age of onset: 49) in her mother; Rheum arthritis (age of onset: 62) in her mother; Seizures in her brother; Stroke in her mother; Thyroid disease in her paternal grandmother.  She  reports that she has never smoked. She has never been exposed to tobacco smoke. She has never used smokeless tobacco. She reports that she does not currently use alcohol. She reports that she does not use drugs.  Current Outpatient Medications   Medication Sig Dispense Refill   • drospirenone-ethinyl estradiol (KOKO) 3-0.02 MG per tablet Take 1 tablet by mouth daily 90 tablet 4     No current facility-administered medications for this visit.     She is allergic to clindamycin..    Review of Systems   Constitutional:  Negative for chills and fever.   HENT:  Negative for ear pain and sore throat.    Eyes:  Negative for pain and visual disturbance.   Respiratory:  Negative for cough and shortness of breath.    Cardiovascular:  Negative for chest pain and palpitations.   Gastrointestinal:  Negative for abdominal pain and vomiting.   Endocrine: Negative.    Genitourinary: Negative.  Negative for dysuria and hematuria.   Musculoskeletal:  Negative for arthralgias and back pain.   Skin:  Negative for color change and rash.   Allergic/Immunologic: Negative.    Neurological: Negative.  Negative for seizures and syncope.   Hematological: Negative.    Psychiatric/Behavioral: Negative.     All other systems reviewed and are negative.        Objective:  /80 (BP Location: Left arm, Patient Position: Sitting, Cuff Size: Standard)   Ht 5' 5\" (1.651 m)   Wt 89.8 kg (198 lb)   LMP 01/25/2025   BMI 32.95 kg/m²    Physical " Exam  Vitals and nursing note reviewed.   Constitutional:       Appearance: Normal appearance.   HENT:      Head: Normocephalic.   Cardiovascular:      Rate and Rhythm: Normal rate and regular rhythm.      Pulses: Normal pulses.      Heart sounds: Normal heart sounds.   Pulmonary:      Effort: Pulmonary effort is normal.      Breath sounds: Normal breath sounds.   Chest:      Chest wall: No mass, lacerations, swelling, tenderness or edema.   Breasts:     Brando Score is 4.      Breasts are symmetrical.      Right: Normal. No swelling, bleeding, inverted nipple, mass, nipple discharge, skin change or tenderness.      Left: No swelling, bleeding, inverted nipple, mass, nipple discharge, skin change or tenderness.   Abdominal:      General: Abdomen is flat. Bowel sounds are normal.      Palpations: Abdomen is soft.   Genitourinary:     General: Normal vulva.      Exam position: Lithotomy position.      Pubic Area: No rash.       Brando stage (genital): 4.      Labia:         Right: No rash, tenderness or lesion.         Left: No rash, tenderness or lesion.       Urethra: No urethral pain, urethral swelling or urethral lesion.      Vagina: Normal.      Cervix: No cervical motion tenderness or discharge.      Uterus: Normal.       Adnexa: Right adnexa normal and left adnexa normal.      Rectum: Normal.      Comments: Multiple  areas of  scar tissue  noted  no active  comedones   Musculoskeletal:         General: Normal range of motion.      Cervical back: Normal range of motion and neck supple.   Lymphadenopathy:      Upper Body:      Right upper body: No supraclavicular, axillary or pectoral adenopathy.      Left upper body: No supraclavicular, axillary or pectoral adenopathy.      Lower Body: No right inguinal adenopathy. No left inguinal adenopathy.   Skin:     General: Skin is warm and dry.   Neurological:      General: No focal deficit present.      Mental Status: She is alert and oriented to person, place, and  time.   Psychiatric:         Mood and Affect: Mood normal.         Behavior: Behavior normal.         Thought Content: Thought content normal.         Judgment: Judgment normal.

## 2025-02-09 LAB
CYTOLOGIST CVX/VAG CYTO: NORMAL
DX ICD CODE: NORMAL
HPV GENOTYPE REFLEX: NORMAL
HPV I/H RISK 4 DNA CVX QL PROBE+SIG AMP: NEGATIVE
OTHER STN SPEC: NORMAL
PATH REPORT.FINAL DX SPEC: NORMAL
SL AMB NOTE:: NORMAL
SL AMB SPECIMEN ADEQUACY: NORMAL
SL AMB TEST METHODOLOGY: NORMAL

## 2025-03-06 PROBLEM — Z12.4 SCREENING FOR CERVICAL CANCER: Status: RESOLVED | Noted: 2025-02-04 | Resolved: 2025-03-06

## 2025-08-10 ENCOUNTER — HOSPITAL ENCOUNTER (EMERGENCY)
Age: 45
Discharge: HOME | End: 2025-08-11
Attending: INTERNAL MEDICINE | Admitting: INTERNAL MEDICINE
Payer: COMMERCIAL

## 2025-08-10 ENCOUNTER — APPOINTMENT (EMERGENCY)
Age: 45
End: 2025-08-10
Payer: COMMERCIAL

## 2025-08-18 ENCOUNTER — HOSPITAL ENCOUNTER (OUTPATIENT)
Age: 45
Discharge: HOME | End: 2025-08-18
Attending: PHYSICIAN ASSISTANT

## 2025-08-18 DIAGNOSIS — R93.3 ABNORMAL FINDINGS ON DIAGNOSTIC IMAGING OF OTHER PARTS OF DIGESTIVE TRACT: ICD-10-CM
